# Patient Record
Sex: FEMALE | Race: WHITE | NOT HISPANIC OR LATINO
[De-identification: names, ages, dates, MRNs, and addresses within clinical notes are randomized per-mention and may not be internally consistent; named-entity substitution may affect disease eponyms.]

---

## 2019-01-28 ENCOUNTER — APPOINTMENT (OUTPATIENT)
Dept: ORTHOPEDIC SURGERY | Facility: CLINIC | Age: 54
End: 2019-01-28
Payer: COMMERCIAL

## 2019-01-28 DIAGNOSIS — Z78.9 OTHER SPECIFIED HEALTH STATUS: ICD-10-CM

## 2019-01-28 DIAGNOSIS — M25.561 PAIN IN RIGHT KNEE: ICD-10-CM

## 2019-01-28 DIAGNOSIS — F41.9 ANXIETY DISORDER, UNSPECIFIED: ICD-10-CM

## 2019-01-28 DIAGNOSIS — Z82.49 FAMILY HISTORY OF ISCHEMIC HEART DISEASE AND OTHER DISEASES OF THE CIRCULATORY SYSTEM: ICD-10-CM

## 2019-01-28 PROBLEM — Z00.00 ENCOUNTER FOR PREVENTIVE HEALTH EXAMINATION: Status: ACTIVE | Noted: 2019-01-28

## 2019-01-28 PROCEDURE — 99203 OFFICE O/P NEW LOW 30 MIN: CPT

## 2019-01-28 PROCEDURE — 73502 X-RAY EXAM HIP UNI 2-3 VIEWS: CPT | Mod: RT

## 2019-01-28 PROCEDURE — 73562 X-RAY EXAM OF KNEE 3: CPT | Mod: RT

## 2019-01-28 RX ORDER — ESCITALOPRAM OXALATE 10 MG/1
10 TABLET ORAL
Refills: 0 | Status: ACTIVE | COMMUNITY
Start: 2019-01-28

## 2019-01-28 RX ORDER — TIZANIDINE 4 MG/1
4 TABLET ORAL
Qty: 42 | Refills: 0 | Status: DISCONTINUED | COMMUNITY
Start: 2018-09-15

## 2019-01-28 RX ORDER — CYCLOBENZAPRINE HYDROCHLORIDE 5 MG/1
5 TABLET, FILM COATED ORAL
Qty: 14 | Refills: 0 | Status: DISCONTINUED | COMMUNITY
Start: 2018-09-29

## 2019-01-28 RX ORDER — IBUPROFEN 800 MG/1
800 TABLET, FILM COATED ORAL
Qty: 42 | Refills: 0 | Status: DISCONTINUED | COMMUNITY
Start: 2018-09-15

## 2019-01-28 NOTE — PHYSICAL EXAM
[] : Motor: [Motor Strength Lower Extremities] : right (5/5) [2+] : left 2+ [Normal] : Alert and in no acute distress [de-identified] : Full ROM about the right knee with flexion and extension decreased ROM with internal and external rotation about the right hip. Pain with motion about the right hip.  [de-identified] : Sensation grossly intact about bilateral lower extremity.  [de-identified] : Non tender to palpation, no swelling [de-identified] : Radiographs done on January 28, 2019 AP pelvis lateral of the right hip AP lateral skyline both knees demonstrate a bony structures to be intact no fractures or dislocations she has a well-maintained joint space of the left hip for bone-on-bone arthritis of the right hip with subchondral sclerosis and osteophyte formation the radiographs of the knees show moderate medial compartment narrowing bilaterally a little more her advanced on the left than the right. Impression end-stage osteoarthritis of the right hip moderate medial compartment arthritis both knees

## 2019-01-28 NOTE — HISTORY OF PRESENT ILLNESS
[Pain Location] : pain [Standing] : standing [Hip Movement] : worsened by hip movement [Walking] : worsened by walking [Acetaminophen] : relieved by acetaminophen [Heat] : relieved by heat [Ice] : relieved by ice [NSAIDs] : relieved by nonsteroidal anti-inflammatory drugs [de-identified] : 53 year old female presents to the office today complaining of right knee pain x1 year and right hip pain x6 months. Pt reports difficulty walking up and down stairs and standing from a seated position. Pt describes the knee pain as heavy in nature. Pt states that while the knee pain began first, the right hip pain has become worse. Pt states that she can not sleep on her right side. Pt has been using a massage/heating device and reports some relief in the past week.  [de-identified] : Sitting to standing

## 2019-01-28 NOTE — ASSESSMENT
[FreeTextEntry1] : This is a 53-year-old female who comes in for her first evaluation of pain in her right hip. She describes the pain as being located in the groin with radiation down the thigh but at times it goes past the knee into the lower leg and ankle. She is only taking over-the-counter medications for this pain she's had no prescription nonsteroidal anti-inflammatories no injections no specific treatment whatsoever. She has been taking glucosamine and chondroitin sulfate as well as tumor the pain is at times severe it prevents her from walking going up and down stairs putting on her shoes and socks and doing many of her activities of daily living on physical exam she walks with antalgic gait she has a painless range of motion of the left hip and both knees are restricted painful range of motion of the right hip which elicits pain in the groin. I explained to her that she has osteoarthritis of the hip we went over treatment options we decided to initiate conservative care with meloxicam 15 mg p.o. q.d. and we'll see her back in a month

## 2019-02-27 ENCOUNTER — RX RENEWAL (OUTPATIENT)
Age: 54
End: 2019-02-27

## 2019-03-01 ENCOUNTER — APPOINTMENT (OUTPATIENT)
Dept: ORTHOPEDIC SURGERY | Facility: CLINIC | Age: 54
End: 2019-03-01
Payer: COMMERCIAL

## 2019-03-01 ENCOUNTER — TRANSCRIPTION ENCOUNTER (OUTPATIENT)
Age: 54
End: 2019-03-01

## 2019-03-01 PROCEDURE — 99212 OFFICE O/P EST SF 10 MIN: CPT

## 2019-03-01 NOTE — REASON FOR VISIT
[Follow-Up Visit] : a follow-up visit for [Hip Pain] : hip pain [Osteoarthritis, Hip] : osteoarthritis, hip [Knee Pain] : knee pain [Osteoarthritis, Knee] : osteoarthritis, knee

## 2019-03-04 NOTE — HISTORY OF PRESENT ILLNESS
[Pain Location] : pain [Standing] : standing [Hip Movement] : worsened by hip movement [Walking] : worsened by walking [Acetaminophen] : relieved by acetaminophen [Heat] : relieved by heat [Ice] : relieved by ice [NSAIDs] : relieved by nonsteroidal anti-inflammatory drugs [de-identified] : 53 year old female presents to the office today complaining of right knee pain x1 year and right hip pain x6 months. Pt reports difficulty walking up and down stairs and standing from a seated position. Pt describes the knee pain as heavy in nature. Pt states that while the knee pain began first, the right hip pain has become worse. Pt was seen in our office one month ago and was prescribed Meloxicam with mild relief. Patient reports doing at home exercises that have also given her some relief. Patient will be starting physical therapy later today.  [de-identified] : Sitting to standing

## 2019-03-04 NOTE — PHYSICAL EXAM
[] : Motor: [Motor Strength Lower Extremities] : right (5/5) [2+] : left 2+ [Normal] : Alert and in no acute distress [de-identified] : Full ROM about the right knee with flexion and extension decreased ROM with internal and external rotation about the right hip. Pain with motion about the right hip.  [de-identified] : Sensation grossly intact about bilateral lower extremity.  [de-identified] : Non tender to palpation, no swelling [de-identified] : Radiographs done on January 28, 2019 AP pelvis lateral of the right hip AP lateral skyline both knees demonstrate a bony structures to be intact no fractures or dislocations she has a well-maintained joint space of the left hip for bone-on-bone arthritis of the right hip with subchondral sclerosis and osteophyte formation the radiographs of the knees show moderate medial compartment narrowing bilaterally a little more her advanced on the left than the right. Impression end-stage osteoarthritis of the right hip moderate medial compartment arthritis both knees

## 2019-05-08 ENCOUNTER — RX RENEWAL (OUTPATIENT)
Age: 54
End: 2019-05-08

## 2019-07-03 ENCOUNTER — APPOINTMENT (OUTPATIENT)
Dept: ORTHOPEDIC SURGERY | Facility: CLINIC | Age: 54
End: 2019-07-03
Payer: COMMERCIAL

## 2019-07-03 DIAGNOSIS — M25.551 PAIN IN RIGHT HIP: ICD-10-CM

## 2019-07-03 DIAGNOSIS — M16.11 UNILATERAL PRIMARY OSTEOARTHRITIS, RIGHT HIP: ICD-10-CM

## 2019-07-03 DIAGNOSIS — Z86.79 PERSONAL HISTORY OF OTHER DISEASES OF THE CIRCULATORY SYSTEM: ICD-10-CM

## 2019-07-03 PROCEDURE — 99214 OFFICE O/P EST MOD 30 MIN: CPT | Mod: 57

## 2019-07-03 NOTE — ASSESSMENT
[FreeTextEntry1] : Pt here for fu for her arthritic right hip. She was started on Mobic for her last visit. It unfortunately did not help her enough ad the pain is significant enough that he would like to proceed with elective hip replacement surgery. Since the last visit she was started on a anti hypertensive medication, there was also a question to if the Mobic could have contributed to this. Pt will be scheduled for elective RTH , she will see Dr. Park for medical eval prior to surgery. \par \par The risks, benefits, alternatives of treatment, and aftercare precautions following joint replacement surgery were reviewed with the patient and all questions were answered. Models were used, radiographs reviewed and a brochure was given to the patient. The implant type utilized, including bearing surfaces fixation techniques were reviewed in detail, and the patient chose to proceed with elective joint replacement surgery. The patient's body mass index was recorded in my office notes, and for those patients whose  body mass index of greater than 30, I recommended that they review a weight reduction program with their internist. The importance of smoking cessation was reviewed with all patient's, and for those patients who still smoke, I recommended that they review a smoking cessation program with their internist.

## 2019-07-16 ENCOUNTER — OUTPATIENT (OUTPATIENT)
Dept: OUTPATIENT SERVICES | Facility: HOSPITAL | Age: 54
LOS: 1 days | Discharge: HOME | End: 2019-07-16
Payer: COMMERCIAL

## 2019-07-16 VITALS
HEART RATE: 72 BPM | WEIGHT: 202.83 LBS | TEMPERATURE: 98 F | HEIGHT: 63 IN | RESPIRATION RATE: 16 BRPM | SYSTOLIC BLOOD PRESSURE: 145 MMHG | DIASTOLIC BLOOD PRESSURE: 86 MMHG | OXYGEN SATURATION: 97 %

## 2019-07-16 DIAGNOSIS — Z98.890 OTHER SPECIFIED POSTPROCEDURAL STATES: Chronic | ICD-10-CM

## 2019-07-16 DIAGNOSIS — Z01.818 ENCOUNTER FOR OTHER PREPROCEDURAL EXAMINATION: ICD-10-CM

## 2019-07-16 DIAGNOSIS — M16.11 UNILATERAL PRIMARY OSTEOARTHRITIS, RIGHT HIP: ICD-10-CM

## 2019-07-16 DIAGNOSIS — Z90.49 ACQUIRED ABSENCE OF OTHER SPECIFIED PARTS OF DIGESTIVE TRACT: Chronic | ICD-10-CM

## 2019-07-16 DIAGNOSIS — Z90.710 ACQUIRED ABSENCE OF BOTH CERVIX AND UTERUS: Chronic | ICD-10-CM

## 2019-07-16 LAB
ALBUMIN SERPL ELPH-MCNC: 4.6 G/DL — SIGNIFICANT CHANGE UP (ref 3.5–5.2)
ALP SERPL-CCNC: 93 U/L — SIGNIFICANT CHANGE UP (ref 30–115)
ALT FLD-CCNC: 21 U/L — SIGNIFICANT CHANGE UP (ref 0–41)
ANION GAP SERPL CALC-SCNC: 14 MMOL/L — SIGNIFICANT CHANGE UP (ref 7–14)
APPEARANCE UR: CLEAR — SIGNIFICANT CHANGE UP
APTT BLD: 35.7 SEC — SIGNIFICANT CHANGE UP (ref 27–39.2)
AST SERPL-CCNC: 15 U/L — SIGNIFICANT CHANGE UP (ref 0–41)
BACTERIA # UR AUTO: NEGATIVE — SIGNIFICANT CHANGE UP
BASOPHILS # BLD AUTO: 0.06 K/UL — SIGNIFICANT CHANGE UP (ref 0–0.2)
BASOPHILS NFR BLD AUTO: 1 % — SIGNIFICANT CHANGE UP (ref 0–1)
BILIRUB SERPL-MCNC: 0.4 MG/DL — SIGNIFICANT CHANGE UP (ref 0.2–1.2)
BILIRUB UR-MCNC: NEGATIVE — SIGNIFICANT CHANGE UP
BLD GP AB SCN SERPL QL: SIGNIFICANT CHANGE UP
BUN SERPL-MCNC: 10 MG/DL — SIGNIFICANT CHANGE UP (ref 10–20)
CALCIUM SERPL-MCNC: 9.7 MG/DL — SIGNIFICANT CHANGE UP (ref 8.5–10.1)
CHLORIDE SERPL-SCNC: 100 MMOL/L — SIGNIFICANT CHANGE UP (ref 98–110)
CO2 SERPL-SCNC: 27 MMOL/L — SIGNIFICANT CHANGE UP (ref 17–32)
COLOR SPEC: YELLOW — SIGNIFICANT CHANGE UP
CREAT SERPL-MCNC: 0.6 MG/DL — LOW (ref 0.7–1.5)
DIFF PNL FLD: NEGATIVE — SIGNIFICANT CHANGE UP
EOSINOPHIL # BLD AUTO: 0.1 K/UL — SIGNIFICANT CHANGE UP (ref 0–0.7)
EOSINOPHIL NFR BLD AUTO: 1.6 % — SIGNIFICANT CHANGE UP (ref 0–8)
EPI CELLS # UR: 3 /HPF — SIGNIFICANT CHANGE UP (ref 0–5)
ESTIMATED AVERAGE GLUCOSE: 117 MG/DL — HIGH (ref 68–114)
GLUCOSE SERPL-MCNC: 109 MG/DL — HIGH (ref 70–99)
GLUCOSE UR QL: NEGATIVE — SIGNIFICANT CHANGE UP
HBA1C BLD-MCNC: 5.7 % — HIGH (ref 4–5.6)
HCT VFR BLD CALC: 40.6 % — SIGNIFICANT CHANGE UP (ref 37–47)
HGB BLD-MCNC: 13.7 G/DL — SIGNIFICANT CHANGE UP (ref 12–16)
HYALINE CASTS # UR AUTO: 2 /LPF — SIGNIFICANT CHANGE UP (ref 0–7)
IMM GRANULOCYTES NFR BLD AUTO: 0.2 % — SIGNIFICANT CHANGE UP (ref 0.1–0.3)
INR BLD: 0.92 RATIO — SIGNIFICANT CHANGE UP (ref 0.65–1.3)
KETONES UR-MCNC: NEGATIVE — SIGNIFICANT CHANGE UP
LEUKOCYTE ESTERASE UR-ACNC: NEGATIVE — SIGNIFICANT CHANGE UP
LYMPHOCYTES # BLD AUTO: 1.95 K/UL — SIGNIFICANT CHANGE UP (ref 1.2–3.4)
LYMPHOCYTES # BLD AUTO: 31.3 % — SIGNIFICANT CHANGE UP (ref 20.5–51.1)
MCHC RBC-ENTMCNC: 29.7 PG — SIGNIFICANT CHANGE UP (ref 27–31)
MCHC RBC-ENTMCNC: 33.7 G/DL — SIGNIFICANT CHANGE UP (ref 32–37)
MCV RBC AUTO: 88.1 FL — SIGNIFICANT CHANGE UP (ref 81–99)
MONOCYTES # BLD AUTO: 0.39 K/UL — SIGNIFICANT CHANGE UP (ref 0.1–0.6)
MONOCYTES NFR BLD AUTO: 6.3 % — SIGNIFICANT CHANGE UP (ref 1.7–9.3)
MRSA PCR RESULT.: NEGATIVE — SIGNIFICANT CHANGE UP
NEUTROPHILS # BLD AUTO: 3.72 K/UL — SIGNIFICANT CHANGE UP (ref 1.4–6.5)
NEUTROPHILS NFR BLD AUTO: 59.6 % — SIGNIFICANT CHANGE UP (ref 42.2–75.2)
NITRITE UR-MCNC: NEGATIVE — SIGNIFICANT CHANGE UP
NRBC # BLD: 0 /100 WBCS — SIGNIFICANT CHANGE UP (ref 0–0)
PH UR: 6 — SIGNIFICANT CHANGE UP (ref 5–8)
PLATELET # BLD AUTO: 333 K/UL — SIGNIFICANT CHANGE UP (ref 130–400)
POTASSIUM SERPL-MCNC: 4.3 MMOL/L — SIGNIFICANT CHANGE UP (ref 3.5–5)
POTASSIUM SERPL-SCNC: 4.3 MMOL/L — SIGNIFICANT CHANGE UP (ref 3.5–5)
PROT SERPL-MCNC: 7.8 G/DL — SIGNIFICANT CHANGE UP (ref 6–8)
PROT UR-MCNC: ABNORMAL
PROTHROM AB SERPL-ACNC: 10.6 SEC — SIGNIFICANT CHANGE UP (ref 9.95–12.87)
RBC # BLD: 4.61 M/UL — SIGNIFICANT CHANGE UP (ref 4.2–5.4)
RBC # FLD: 13.4 % — SIGNIFICANT CHANGE UP (ref 11.5–14.5)
RBC CASTS # UR COMP ASSIST: 1 /HPF — SIGNIFICANT CHANGE UP (ref 0–4)
SODIUM SERPL-SCNC: 141 MMOL/L — SIGNIFICANT CHANGE UP (ref 135–146)
SP GR SPEC: 1.03 — HIGH (ref 1.01–1.02)
UROBILINOGEN FLD QL: SIGNIFICANT CHANGE UP
WBC # BLD: 6.23 K/UL — SIGNIFICANT CHANGE UP (ref 4.8–10.8)
WBC # FLD AUTO: 6.23 K/UL — SIGNIFICANT CHANGE UP (ref 4.8–10.8)
WBC UR QL: 2 /HPF — SIGNIFICANT CHANGE UP (ref 0–5)

## 2019-07-16 PROCEDURE — 93010 ELECTROCARDIOGRAM REPORT: CPT

## 2019-07-16 PROCEDURE — 71046 X-RAY EXAM CHEST 2 VIEWS: CPT | Mod: 26

## 2019-07-16 PROCEDURE — 73502 X-RAY EXAM HIP UNI 2-3 VIEWS: CPT | Mod: 26,RT

## 2019-07-16 NOTE — H&P PST ADULT - NSICDXPASTSURGICALHX_GEN_ALL_CORE_FT
PAST SURGICAL HISTORY:  H/O abdominoplasty     History of appendectomy     History of hysterectomy     S/P colonoscopy with polypectomy

## 2019-07-16 NOTE — H&P PST ADULT - REASON FOR ADMISSION
54 yo female presents with long hx right hip pain, pt is scheduled for rthr;  denies chest pain, palpitations, shortness of breath, dyspnea, or dysuria. exercise tolerance: 2 blocks/ flights of stairs w/o sob, ex pilar: limited dt pain

## 2019-07-16 NOTE — H&P PST ADULT - NSICDXPASTMEDICALHX_GEN_ALL_CORE_FT
PAST MEDICAL HISTORY:  Anxiety     Fuchs' corneal dystrophy     H/O superficial phlebitis "years ago"    HTN (hypertension)     Murmur     OA (osteoarthritis)

## 2019-07-17 LAB
CULTURE RESULTS: SIGNIFICANT CHANGE UP
SPECIMEN SOURCE: SIGNIFICANT CHANGE UP

## 2019-07-19 ENCOUNTER — APPOINTMENT (OUTPATIENT)
Dept: CARDIOLOGY | Facility: CLINIC | Age: 54
End: 2019-07-19

## 2019-07-19 NOTE — ASU PATIENT PROFILE, ADULT - PSH
H/O abdominoplasty    History of appendectomy    History of hysterectomy    S/P colonoscopy with polypectomy H/O abdominoplasty    H/O colonoscopy with polypectomy  last colonoscopy 4 years ago  History of appendectomy    History of hysterectomy    S/P colonoscopy with polypectomy

## 2019-07-19 NOTE — ASU PATIENT PROFILE, ADULT - PMH
Anxiety    Fuchs' corneal dystrophy    H/O superficial phlebitis  "years ago"  HTN (hypertension)    Murmur    OA (osteoarthritis)

## 2019-07-22 ENCOUNTER — TRANSCRIPTION ENCOUNTER (OUTPATIENT)
Age: 54
End: 2019-07-22

## 2019-07-22 ENCOUNTER — OUTPATIENT (OUTPATIENT)
Dept: OUTPATIENT SERVICES | Facility: HOSPITAL | Age: 54
LOS: 1 days | Discharge: HOME | End: 2019-07-22
Payer: COMMERCIAL

## 2019-07-22 ENCOUNTER — RESULT REVIEW (OUTPATIENT)
Age: 54
End: 2019-07-22

## 2019-07-22 VITALS
WEIGHT: 199.08 LBS | DIASTOLIC BLOOD PRESSURE: 78 MMHG | HEIGHT: 63 IN | OXYGEN SATURATION: 96 % | TEMPERATURE: 96 F | RESPIRATION RATE: 17 BRPM | HEART RATE: 77 BPM | SYSTOLIC BLOOD PRESSURE: 139 MMHG

## 2019-07-22 VITALS — SYSTOLIC BLOOD PRESSURE: 139 MMHG | DIASTOLIC BLOOD PRESSURE: 85 MMHG | HEART RATE: 94 BPM | RESPIRATION RATE: 17 BRPM

## 2019-07-22 DIAGNOSIS — Z98.890 OTHER SPECIFIED POSTPROCEDURAL STATES: Chronic | ICD-10-CM

## 2019-07-22 DIAGNOSIS — Z90.710 ACQUIRED ABSENCE OF BOTH CERVIX AND UTERUS: Chronic | ICD-10-CM

## 2019-07-22 DIAGNOSIS — Z90.49 ACQUIRED ABSENCE OF OTHER SPECIFIED PARTS OF DIGESTIVE TRACT: Chronic | ICD-10-CM

## 2019-07-22 PROCEDURE — 88305 TISSUE EXAM BY PATHOLOGIST: CPT | Mod: 26

## 2019-07-22 PROCEDURE — 88312 SPECIAL STAINS GROUP 1: CPT | Mod: 26

## 2019-07-22 PROCEDURE — 45380 COLONOSCOPY AND BIOPSY: CPT

## 2019-07-22 PROCEDURE — 43239 EGD BIOPSY SINGLE/MULTIPLE: CPT | Mod: XS

## 2019-07-23 LAB
SURGICAL PATHOLOGY STUDY: SIGNIFICANT CHANGE UP
SURGICAL PATHOLOGY STUDY: SIGNIFICANT CHANGE UP

## 2019-07-25 DIAGNOSIS — Z87.19 PERSONAL HISTORY OF OTHER DISEASES OF THE DIGESTIVE SYSTEM: ICD-10-CM

## 2019-07-25 DIAGNOSIS — K29.50 UNSPECIFIED CHRONIC GASTRITIS WITHOUT BLEEDING: ICD-10-CM

## 2019-07-25 DIAGNOSIS — R01.1 CARDIAC MURMUR, UNSPECIFIED: ICD-10-CM

## 2019-07-25 DIAGNOSIS — M19.90 UNSPECIFIED OSTEOARTHRITIS, UNSPECIFIED SITE: ICD-10-CM

## 2019-07-25 DIAGNOSIS — D12.4 BENIGN NEOPLASM OF DESCENDING COLON: ICD-10-CM

## 2019-07-25 DIAGNOSIS — D13.1 BENIGN NEOPLASM OF STOMACH: ICD-10-CM

## 2019-07-25 DIAGNOSIS — Z91.040 LATEX ALLERGY STATUS: ICD-10-CM

## 2019-07-25 DIAGNOSIS — Z12.11 ENCOUNTER FOR SCREENING FOR MALIGNANT NEOPLASM OF COLON: ICD-10-CM

## 2019-07-25 DIAGNOSIS — F41.9 ANXIETY DISORDER, UNSPECIFIED: ICD-10-CM

## 2019-07-25 DIAGNOSIS — K64.4 RESIDUAL HEMORRHOIDAL SKIN TAGS: ICD-10-CM

## 2019-07-25 DIAGNOSIS — I10 ESSENTIAL (PRIMARY) HYPERTENSION: ICD-10-CM

## 2019-07-25 DIAGNOSIS — B96.81 HELICOBACTER PYLORI [H. PYLORI] AS THE CAUSE OF DISEASES CLASSIFIED ELSEWHERE: ICD-10-CM

## 2019-07-25 DIAGNOSIS — Z88.3 ALLERGY STATUS TO OTHER ANTI-INFECTIVE AGENTS: ICD-10-CM

## 2019-07-25 DIAGNOSIS — K64.8 OTHER HEMORRHOIDS: ICD-10-CM

## 2019-07-25 PROBLEM — H18.51 ENDOTHELIAL CORNEAL DYSTROPHY: Chronic | Status: ACTIVE | Noted: 2019-07-16

## 2019-07-25 PROBLEM — Z86.79 PERSONAL HISTORY OF OTHER DISEASES OF THE CIRCULATORY SYSTEM: Chronic | Status: ACTIVE | Noted: 2019-07-16

## 2019-07-29 NOTE — PROGRESS NOTE ADULT - SUBJECTIVE AND OBJECTIVE BOX
PAST documents reviewed - MED. DIR. PAST - ANESTHESIA - as of this review for patient scheduled for Right THR under Spinal / Regional Anesthesia with  on 07/30/2019 : I called the patient to discuss the Anesthesia Plan and Medications. She attended the Joint class and is aware of the ERAS / Gatorade protocol and her understanding was confirmed with our discussion . She knows she is to receive Spinal / Regional Anesthesia which I explained to her preliminarily and informed her that the assigned Anesthesiologist would explain everything in full pre op . If patient takes her Tylenol tonight prior to MN with a sip of H2O she was told to inform the admitting RN of the time and dosage . She had taken Aleve which she stopped > 7 days . She will not take her HCTZ DOS . She takes no AC / Antiplatelets / other RX or OTC NSAID ' s / nor ASA containing meds ( all explained to her in full ) . Lab work has been reviewed and appropriate pre op meds are ordered .

## 2019-07-30 ENCOUNTER — INPATIENT (INPATIENT)
Facility: HOSPITAL | Age: 54
LOS: 1 days | Discharge: HOME HEALTH | End: 2019-08-01
Attending: ORTHOPAEDIC SURGERY | Admitting: ORTHOPAEDIC SURGERY
Payer: COMMERCIAL

## 2019-07-30 ENCOUNTER — RESULT REVIEW (OUTPATIENT)
Age: 54
End: 2019-07-30

## 2019-07-30 ENCOUNTER — APPOINTMENT (OUTPATIENT)
Dept: ORTHOPEDIC SURGERY | Facility: HOSPITAL | Age: 54
End: 2019-07-30
Payer: COMMERCIAL

## 2019-07-30 VITALS
TEMPERATURE: 98 F | HEART RATE: 71 BPM | DIASTOLIC BLOOD PRESSURE: 99 MMHG | RESPIRATION RATE: 18 BRPM | SYSTOLIC BLOOD PRESSURE: 134 MMHG | HEIGHT: 64 IN | WEIGHT: 199.96 LBS

## 2019-07-30 DIAGNOSIS — I10 ESSENTIAL (PRIMARY) HYPERTENSION: ICD-10-CM

## 2019-07-30 DIAGNOSIS — M16.11 UNILATERAL PRIMARY OSTEOARTHRITIS, RIGHT HIP: ICD-10-CM

## 2019-07-30 DIAGNOSIS — Z90.710 ACQUIRED ABSENCE OF BOTH CERVIX AND UTERUS: Chronic | ICD-10-CM

## 2019-07-30 DIAGNOSIS — Z90.49 ACQUIRED ABSENCE OF OTHER SPECIFIED PARTS OF DIGESTIVE TRACT: Chronic | ICD-10-CM

## 2019-07-30 DIAGNOSIS — F41.9 ANXIETY DISORDER, UNSPECIFIED: ICD-10-CM

## 2019-07-30 DIAGNOSIS — Z98.890 OTHER SPECIFIED POSTPROCEDURAL STATES: Chronic | ICD-10-CM

## 2019-07-30 DIAGNOSIS — H18.51 ENDOTHELIAL CORNEAL DYSTROPHY: ICD-10-CM

## 2019-07-30 DIAGNOSIS — Z91.040 LATEX ALLERGY STATUS: ICD-10-CM

## 2019-07-30 LAB
ABO RH CONFIRMATION: SIGNIFICANT CHANGE UP
GLUCOSE BLDC GLUCOMTR-MCNC: 128 MG/DL — HIGH (ref 70–99)
GLUCOSE BLDC GLUCOMTR-MCNC: 83 MG/DL — SIGNIFICANT CHANGE UP (ref 70–99)
GLUCOSE BLDC GLUCOMTR-MCNC: 89 MG/DL — SIGNIFICANT CHANGE UP (ref 70–99)

## 2019-07-30 PROCEDURE — 88311 DECALCIFY TISSUE: CPT | Mod: 26

## 2019-07-30 PROCEDURE — 72170 X-RAY EXAM OF PELVIS: CPT | Mod: 26

## 2019-07-30 PROCEDURE — 88305 TISSUE EXAM BY PATHOLOGIST: CPT | Mod: 26

## 2019-07-30 PROCEDURE — 27447 TOTAL KNEE ARTHROPLASTY: CPT | Mod: RT

## 2019-07-30 PROCEDURE — 27130 TOTAL HIP ARTHROPLASTY: CPT | Mod: RT

## 2019-07-30 RX ORDER — POLYETHYLENE GLYCOL 3350 17 G/17G
17 POWDER, FOR SOLUTION ORAL DAILY
Refills: 0 | Status: DISCONTINUED | OUTPATIENT
Start: 2019-07-30 | End: 2019-08-01

## 2019-07-30 RX ORDER — DOCUSATE SODIUM 100 MG
100 CAPSULE ORAL THREE TIMES A DAY
Refills: 0 | Status: DISCONTINUED | OUTPATIENT
Start: 2019-07-30 | End: 2019-08-01

## 2019-07-30 RX ORDER — LOSARTAN POTASSIUM 100 MG/1
50 TABLET, FILM COATED ORAL DAILY
Refills: 0 | Status: DISCONTINUED | OUTPATIENT
Start: 2019-07-30 | End: 2019-08-01

## 2019-07-30 RX ORDER — ASPIRIN/CALCIUM CARB/MAGNESIUM 324 MG
81 TABLET ORAL EVERY 12 HOURS
Refills: 0 | Status: DISCONTINUED | OUTPATIENT
Start: 2019-07-30 | End: 2019-08-01

## 2019-07-30 RX ORDER — KETOROLAC TROMETHAMINE 30 MG/ML
15 SYRINGE (ML) INJECTION EVERY 6 HOURS
Refills: 0 | Status: DISCONTINUED | OUTPATIENT
Start: 2019-07-31 | End: 2019-07-31

## 2019-07-30 RX ORDER — HYDROCHLOROTHIAZIDE 25 MG
12.5 TABLET ORAL DAILY
Refills: 0 | Status: DISCONTINUED | OUTPATIENT
Start: 2019-07-30 | End: 2019-08-01

## 2019-07-30 RX ORDER — LOSARTAN POTASSIUM 100 MG/1
1 TABLET, FILM COATED ORAL
Qty: 0 | Refills: 0 | DISCHARGE

## 2019-07-30 RX ORDER — TRAMADOL HYDROCHLORIDE 50 MG/1
50 TABLET ORAL EVERY 6 HOURS
Refills: 0 | Status: DISCONTINUED | OUTPATIENT
Start: 2019-07-30 | End: 2019-07-31

## 2019-07-30 RX ORDER — SENNA PLUS 8.6 MG/1
2 TABLET ORAL AT BEDTIME
Refills: 0 | Status: DISCONTINUED | OUTPATIENT
Start: 2019-07-30 | End: 2019-08-01

## 2019-07-30 RX ORDER — DEXAMETHASONE 0.5 MG/5ML
4 ELIXIR ORAL ONCE
Refills: 0 | Status: COMPLETED | OUTPATIENT
Start: 2019-07-31 | End: 2019-07-31

## 2019-07-30 RX ORDER — GABAPENTIN 400 MG/1
300 CAPSULE ORAL ONCE
Refills: 0 | Status: COMPLETED | OUTPATIENT
Start: 2019-07-30 | End: 2019-07-30

## 2019-07-30 RX ORDER — ACETAMINOPHEN 500 MG
650 TABLET ORAL EVERY 6 HOURS
Refills: 0 | Status: DISCONTINUED | OUTPATIENT
Start: 2019-07-30 | End: 2019-08-01

## 2019-07-30 RX ORDER — CELECOXIB 200 MG/1
400 CAPSULE ORAL ONCE
Refills: 0 | Status: COMPLETED | OUTPATIENT
Start: 2019-07-30 | End: 2019-07-30

## 2019-07-30 RX ORDER — HYDROMORPHONE HYDROCHLORIDE 2 MG/ML
1 INJECTION INTRAMUSCULAR; INTRAVENOUS; SUBCUTANEOUS
Refills: 0 | Status: DISCONTINUED | OUTPATIENT
Start: 2019-07-30 | End: 2019-07-31

## 2019-07-30 RX ORDER — MAGNESIUM HYDROXIDE 400 MG/1
30 TABLET, CHEWABLE ORAL DAILY
Refills: 0 | Status: DISCONTINUED | OUTPATIENT
Start: 2019-07-30 | End: 2019-08-01

## 2019-07-30 RX ORDER — GABAPENTIN 400 MG/1
100 CAPSULE ORAL THREE TIMES A DAY
Refills: 0 | Status: DISCONTINUED | OUTPATIENT
Start: 2019-07-30 | End: 2019-08-01

## 2019-07-30 RX ORDER — ONDANSETRON 8 MG/1
4 TABLET, FILM COATED ORAL EVERY 6 HOURS
Refills: 0 | Status: DISCONTINUED | OUTPATIENT
Start: 2019-07-30 | End: 2019-08-01

## 2019-07-30 RX ORDER — ESCITALOPRAM OXALATE 10 MG/1
10 TABLET, FILM COATED ORAL DAILY
Refills: 0 | Status: DISCONTINUED | OUTPATIENT
Start: 2019-07-30 | End: 2019-08-01

## 2019-07-30 RX ORDER — CEFAZOLIN SODIUM 1 G
2000 VIAL (EA) INJECTION EVERY 8 HOURS
Refills: 0 | Status: COMPLETED | OUTPATIENT
Start: 2019-07-31 | End: 2019-07-31

## 2019-07-30 RX ORDER — SODIUM CHLORIDE 9 MG/ML
1000 INJECTION, SOLUTION INTRAVENOUS
Refills: 0 | Status: DISCONTINUED | OUTPATIENT
Start: 2019-07-30 | End: 2019-07-31

## 2019-07-30 RX ORDER — PANTOPRAZOLE SODIUM 20 MG/1
40 TABLET, DELAYED RELEASE ORAL
Refills: 0 | Status: DISCONTINUED | OUTPATIENT
Start: 2019-07-30 | End: 2019-08-01

## 2019-07-30 RX ORDER — ESCITALOPRAM OXALATE 10 MG/1
1 TABLET, FILM COATED ORAL
Qty: 0 | Refills: 0 | DISCHARGE

## 2019-07-30 RX ORDER — GLUCOSAMINE HCL/CHONDROITIN SU 500-400 MG
0 CAPSULE ORAL
Qty: 0 | Refills: 0 | DISCHARGE

## 2019-07-30 RX ORDER — HYDROMORPHONE HYDROCHLORIDE 2 MG/ML
0.5 INJECTION INTRAMUSCULAR; INTRAVENOUS; SUBCUTANEOUS
Refills: 0 | Status: DISCONTINUED | OUTPATIENT
Start: 2019-07-30 | End: 2019-07-31

## 2019-07-30 RX ORDER — FERROUS SULFATE 325(65) MG
325 TABLET ORAL
Refills: 0 | Status: DISCONTINUED | OUTPATIENT
Start: 2019-07-30 | End: 2019-08-01

## 2019-07-30 RX ORDER — ONDANSETRON 8 MG/1
4 TABLET, FILM COATED ORAL ONCE
Refills: 0 | Status: DISCONTINUED | OUTPATIENT
Start: 2019-07-30 | End: 2019-07-31

## 2019-07-30 RX ORDER — SODIUM CHLORIDE 9 MG/ML
1000 INJECTION INTRAMUSCULAR; INTRAVENOUS; SUBCUTANEOUS
Refills: 0 | Status: DISCONTINUED | OUTPATIENT
Start: 2019-07-30 | End: 2019-08-01

## 2019-07-30 RX ORDER — ACETAMINOPHEN 500 MG
1000 TABLET ORAL ONCE
Refills: 0 | Status: COMPLETED | OUTPATIENT
Start: 2019-07-30 | End: 2019-07-30

## 2019-07-30 RX ORDER — CELECOXIB 200 MG/1
200 CAPSULE ORAL DAILY
Refills: 0 | Status: DISCONTINUED | OUTPATIENT
Start: 2019-08-01 | End: 2019-08-01

## 2019-07-30 RX ORDER — ACETAMINOPHEN 500 MG
2 TABLET ORAL
Qty: 0 | Refills: 0 | DISCHARGE

## 2019-07-30 RX ADMIN — SODIUM CHLORIDE 75 MILLILITER(S): 9 INJECTION, SOLUTION INTRAVENOUS at 21:35

## 2019-07-30 RX ADMIN — GABAPENTIN 300 MILLIGRAM(S): 400 CAPSULE ORAL at 12:00

## 2019-07-30 RX ADMIN — CELECOXIB 400 MILLIGRAM(S): 200 CAPSULE ORAL at 12:00

## 2019-07-30 RX ADMIN — Medication 1000 MILLIGRAM(S): at 11:59

## 2019-07-30 NOTE — CHART NOTE - NSCHARTNOTEFT_GEN_A_CORE
PACU ANESTHESIA ADMISSION NOTE      Procedure: Arthoplasty of right hip    Post op diagnosis:  Osteoarthritis of right hip      ____  Intubated  TV:______       Rate: ______      FiO2: ______    _x___  Patent Airway    ____  Full return of protective reflexes    ____  Full recovery from anesthesia / back to baseline     Vitals:   T:  98F         R: 16                 BP: 121/72                 Sat: 97                  P: 65      Mental Status:  __x__ Awake   _____ Alert   _____ Drowsy   _____ Sedated    Nausea/Vomiting: no   Pain Scale (0-10):  ___0__    Treatment: ____ None    ____ See Post - Op/PCA Orders    Post - Operative Fluids:   ____ Oral   ___x_ See Post - Op Orders    Plan: Discharge:   ____Home       __x___Floor     _____Critical Care    _____  Other:_________________    Comments: Pt awake, VS stable, no anesthesia complications.

## 2019-07-30 NOTE — ASU PATIENT PROFILE, ADULT - PSH
H/O abdominoplasty    H/O colonoscopy with polypectomy  last colonoscopy 4 years ago  History of appendectomy    History of hysterectomy    S/P colonoscopy with polypectomy

## 2019-07-31 ENCOUNTER — TRANSCRIPTION ENCOUNTER (OUTPATIENT)
Age: 54
End: 2019-07-31

## 2019-07-31 LAB
ANION GAP SERPL CALC-SCNC: 9 MMOL/L — SIGNIFICANT CHANGE UP (ref 7–14)
BUN SERPL-MCNC: 10 MG/DL — SIGNIFICANT CHANGE UP (ref 10–20)
CALCIUM SERPL-MCNC: 8.7 MG/DL — SIGNIFICANT CHANGE UP (ref 8.5–10.1)
CHLORIDE SERPL-SCNC: 104 MMOL/L — SIGNIFICANT CHANGE UP (ref 98–110)
CO2 SERPL-SCNC: 27 MMOL/L — SIGNIFICANT CHANGE UP (ref 17–32)
CREAT SERPL-MCNC: 0.5 MG/DL — LOW (ref 0.7–1.5)
GLUCOSE SERPL-MCNC: 106 MG/DL — HIGH (ref 70–99)
HCT VFR BLD CALC: 32.4 % — LOW (ref 37–47)
HCT VFR BLD CALC: 32.4 % — LOW (ref 37–47)
HGB BLD-MCNC: 10.7 G/DL — LOW (ref 12–16)
HGB BLD-MCNC: 10.9 G/DL — LOW (ref 12–16)
MCHC RBC-ENTMCNC: 29.6 PG — SIGNIFICANT CHANGE UP (ref 27–31)
MCHC RBC-ENTMCNC: 29.9 PG — SIGNIFICANT CHANGE UP (ref 27–31)
MCHC RBC-ENTMCNC: 33 G/DL — SIGNIFICANT CHANGE UP (ref 32–37)
MCHC RBC-ENTMCNC: 33.6 G/DL — SIGNIFICANT CHANGE UP (ref 32–37)
MCV RBC AUTO: 89 FL — SIGNIFICANT CHANGE UP (ref 81–99)
MCV RBC AUTO: 89.5 FL — SIGNIFICANT CHANGE UP (ref 81–99)
NRBC # BLD: 0 /100 WBCS — SIGNIFICANT CHANGE UP (ref 0–0)
NRBC # BLD: 0 /100 WBCS — SIGNIFICANT CHANGE UP (ref 0–0)
PLATELET # BLD AUTO: 235 K/UL — SIGNIFICANT CHANGE UP (ref 130–400)
PLATELET # BLD AUTO: 266 K/UL — SIGNIFICANT CHANGE UP (ref 130–400)
POTASSIUM SERPL-MCNC: 4.5 MMOL/L — SIGNIFICANT CHANGE UP (ref 3.5–5)
POTASSIUM SERPL-SCNC: 4.5 MMOL/L — SIGNIFICANT CHANGE UP (ref 3.5–5)
RBC # BLD: 3.62 M/UL — LOW (ref 4.2–5.4)
RBC # BLD: 3.64 M/UL — LOW (ref 4.2–5.4)
RBC # FLD: 13.6 % — SIGNIFICANT CHANGE UP (ref 11.5–14.5)
RBC # FLD: 13.7 % — SIGNIFICANT CHANGE UP (ref 11.5–14.5)
SODIUM SERPL-SCNC: 140 MMOL/L — SIGNIFICANT CHANGE UP (ref 135–146)
WBC # BLD: 11.28 K/UL — HIGH (ref 4.8–10.8)
WBC # BLD: 17.24 K/UL — HIGH (ref 4.8–10.8)
WBC # FLD AUTO: 11.28 K/UL — HIGH (ref 4.8–10.8)
WBC # FLD AUTO: 17.24 K/UL — HIGH (ref 4.8–10.8)

## 2019-07-31 RX ORDER — PANTOPRAZOLE SODIUM 20 MG/1
1 TABLET, DELAYED RELEASE ORAL
Qty: 30 | Refills: 0
Start: 2019-07-31 | End: 2019-08-29

## 2019-07-31 RX ORDER — CELECOXIB 200 MG/1
1 CAPSULE ORAL
Qty: 14 | Refills: 0
Start: 2019-07-31 | End: 2019-08-13

## 2019-07-31 RX ORDER — GABAPENTIN 400 MG/1
1 CAPSULE ORAL
Qty: 9 | Refills: 0
Start: 2019-07-31 | End: 2019-08-02

## 2019-07-31 RX ORDER — TRAMADOL HYDROCHLORIDE 50 MG/1
50 TABLET ORAL EVERY 4 HOURS
Refills: 0 | Status: DISCONTINUED | OUTPATIENT
Start: 2019-07-31 | End: 2019-08-01

## 2019-07-31 RX ORDER — ASPIRIN/CALCIUM CARB/MAGNESIUM 324 MG
1 TABLET ORAL
Qty: 60 | Refills: 0
Start: 2019-07-31 | End: 2019-08-29

## 2019-07-31 RX ORDER — OXYCODONE HYDROCHLORIDE 5 MG/1
5 TABLET ORAL EVERY 4 HOURS
Refills: 0 | Status: DISCONTINUED | OUTPATIENT
Start: 2019-07-31 | End: 2019-08-01

## 2019-07-31 RX ORDER — TRAMADOL HYDROCHLORIDE 50 MG/1
1 TABLET ORAL
Qty: 42 | Refills: 0
Start: 2019-07-31 | End: 2019-08-06

## 2019-07-31 RX ADMIN — TRAMADOL HYDROCHLORIDE 50 MILLIGRAM(S): 50 TABLET ORAL at 20:15

## 2019-07-31 RX ADMIN — Medication 15 MILLIGRAM(S): at 17:23

## 2019-07-31 RX ADMIN — ESCITALOPRAM OXALATE 10 MILLIGRAM(S): 10 TABLET, FILM COATED ORAL at 11:39

## 2019-07-31 RX ADMIN — Medication 100 MILLIGRAM(S): at 21:19

## 2019-07-31 RX ADMIN — Medication 650 MILLIGRAM(S): at 00:47

## 2019-07-31 RX ADMIN — Medication 15 MILLIGRAM(S): at 23:24

## 2019-07-31 RX ADMIN — Medication 100 MILLIGRAM(S): at 11:30

## 2019-07-31 RX ADMIN — Medication 1 DROP(S): at 11:31

## 2019-07-31 RX ADMIN — GABAPENTIN 100 MILLIGRAM(S): 400 CAPSULE ORAL at 21:19

## 2019-07-31 RX ADMIN — SODIUM CHLORIDE 100 MILLILITER(S): 9 INJECTION INTRAMUSCULAR; INTRAVENOUS; SUBCUTANEOUS at 19:47

## 2019-07-31 RX ADMIN — Medication 650 MILLIGRAM(S): at 05:53

## 2019-07-31 RX ADMIN — LOSARTAN POTASSIUM 50 MILLIGRAM(S): 100 TABLET, FILM COATED ORAL at 05:53

## 2019-07-31 RX ADMIN — GABAPENTIN 100 MILLIGRAM(S): 400 CAPSULE ORAL at 05:52

## 2019-07-31 RX ADMIN — Medication 325 MILLIGRAM(S): at 17:24

## 2019-07-31 RX ADMIN — Medication 81 MILLIGRAM(S): at 05:52

## 2019-07-31 RX ADMIN — Medication 100 MILLIGRAM(S): at 05:52

## 2019-07-31 RX ADMIN — Medication 15 MILLIGRAM(S): at 05:53

## 2019-07-31 RX ADMIN — PANTOPRAZOLE SODIUM 40 MILLIGRAM(S): 20 TABLET, DELAYED RELEASE ORAL at 05:53

## 2019-07-31 RX ADMIN — TRAMADOL HYDROCHLORIDE 50 MILLIGRAM(S): 50 TABLET ORAL at 05:56

## 2019-07-31 RX ADMIN — Medication 650 MILLIGRAM(S): at 01:17

## 2019-07-31 RX ADMIN — GABAPENTIN 100 MILLIGRAM(S): 400 CAPSULE ORAL at 14:17

## 2019-07-31 RX ADMIN — POLYETHYLENE GLYCOL 3350 17 GRAM(S): 17 POWDER, FOR SOLUTION ORAL at 11:39

## 2019-07-31 RX ADMIN — Medication 650 MILLIGRAM(S): at 23:24

## 2019-07-31 RX ADMIN — Medication 4 MILLIGRAM(S): at 08:41

## 2019-07-31 RX ADMIN — Medication 650 MILLIGRAM(S): at 11:30

## 2019-07-31 RX ADMIN — TRAMADOL HYDROCHLORIDE 50 MILLIGRAM(S): 50 TABLET ORAL at 15:24

## 2019-07-31 RX ADMIN — TRAMADOL HYDROCHLORIDE 50 MILLIGRAM(S): 50 TABLET ORAL at 11:29

## 2019-07-31 RX ADMIN — Medication 325 MILLIGRAM(S): at 11:32

## 2019-07-31 RX ADMIN — SODIUM CHLORIDE 100 MILLILITER(S): 9 INJECTION INTRAMUSCULAR; INTRAVENOUS; SUBCUTANEOUS at 01:40

## 2019-07-31 RX ADMIN — Medication 100 MILLIGRAM(S): at 14:17

## 2019-07-31 RX ADMIN — Medication 15 MILLIGRAM(S): at 05:55

## 2019-07-31 RX ADMIN — TRAMADOL HYDROCHLORIDE 50 MILLIGRAM(S): 50 TABLET ORAL at 11:59

## 2019-07-31 RX ADMIN — Medication 1 TABLET(S): at 11:32

## 2019-07-31 RX ADMIN — Medication 650 MILLIGRAM(S): at 17:24

## 2019-07-31 RX ADMIN — Medication 15 MILLIGRAM(S): at 11:32

## 2019-07-31 RX ADMIN — Medication 81 MILLIGRAM(S): at 17:24

## 2019-07-31 RX ADMIN — TRAMADOL HYDROCHLORIDE 50 MILLIGRAM(S): 50 TABLET ORAL at 05:26

## 2019-07-31 RX ADMIN — Medication 12.5 MILLIGRAM(S): at 05:53

## 2019-07-31 RX ADMIN — TRAMADOL HYDROCHLORIDE 50 MILLIGRAM(S): 50 TABLET ORAL at 20:17

## 2019-07-31 RX ADMIN — TRAMADOL HYDROCHLORIDE 50 MILLIGRAM(S): 50 TABLET ORAL at 15:54

## 2019-07-31 RX ADMIN — Medication 650 MILLIGRAM(S): at 05:55

## 2019-07-31 RX ADMIN — Medication 325 MILLIGRAM(S): at 08:40

## 2019-07-31 NOTE — DISCHARGE NOTE PROVIDER - CARE PROVIDER_API CALL
Contreras Rojas)  Orthopaedic Surgery  [address] [place] [address], [address]  [place] [address]  Phone: [phone]  Fax: [phone]  Follow Up Time:

## 2019-07-31 NOTE — DISCHARGE NOTE PROVIDER - NSDCCPGOAL_GEN_ALL_CORE_FT
To get better and follow your care plan as instructed. To get better and follow your care plan as instructed, cont asa 81 mg bid for dvt ppx, protonix 40 mg po for GI ppx, f/u with dr Rojas as OP in 2 weeks

## 2019-07-31 NOTE — PROGRESS NOTE ADULT - SUBJECTIVE AND OBJECTIVE BOX
Patient seen and examined at bedside post-operatively. Pain well controlled. No complaints at this time.     Physical exam  Vital Signs Last 24 Hrs  T(C): 36.7 (30 Jul 2019 21:35), Max: 36.7 (30 Jul 2019 11:39)  T(F): 98.1 (30 Jul 2019 21:35), Max: 98.1 (30 Jul 2019 11:39)  HR: 73 (30 Jul 2019 23:05) (60 - 73)  BP: 111/62 (30 Jul 2019 23:05) (95/52 - 134/99)  BP(mean): 61 (30 Jul 2019 23:05) (61 - 94)  RR: 15 (30 Jul 2019 23:05) (10 - 18)  SpO2: 98% (30 Jul 2019 22:50) (97% - 100%)    NAD, AOx3  Non-labored breathing   Left Right  Dressing C/D/I  EHL/FHL/GA/TA Motor intact  SP/DP/T/S/S SILT distally  Toes WWP    53F POD#0 s/p R PILLO, doing well    Pain control  DVT ppx  IV Abx x24hrs  WBAT  OOBTC/IS  PT/OT  Continue Home meds  AM labs Patient seen and examined at bedside post-operatively. Pain well controlled. No complaints at this time.     Physical exam  Vital Signs Last 24 Hrs  T(C): 36.7 (30 Jul 2019 21:35), Max: 36.7 (30 Jul 2019 11:39)  T(F): 98.1 (30 Jul 2019 21:35), Max: 98.1 (30 Jul 2019 11:39)  HR: 73 (30 Jul 2019 23:05) (60 - 73)  BP: 111/62 (30 Jul 2019 23:05) (95/52 - 134/99)  BP(mean): 61 (30 Jul 2019 23:05) (61 - 94)  RR: 15 (30 Jul 2019 23:05) (10 - 18)  SpO2: 98% (30 Jul 2019 22:50) (97% - 100%)    NAD, AOx3  Non-labored breathing   Right hip  Dressing C/D/I  EHL/FHL/GA/TA Motor intact  SP/DP/T/S/S SILT distally  Toes WWP    53F POD#0 s/p R PILLO, doing well    Pain control  DVT ppx  IV Abx x24hrs  WBAT  OOBTC/IS  PT/OT  Continue Home meds  AM labs

## 2019-07-31 NOTE — DISCHARGE NOTE PROVIDER - NSDCFUADDINST_GEN_ALL_CORE_FT
keep surgical site clean and dry, may remove dressing  . Call Dr. Rojas if any wound drainage, redness , increasing pain, fevers over 101 or if you have any questions or concerns.    Visiting nurse to remove bandage if applicable. You may shower , do not scrub surgical site. Do not apply any lotions/moisturizers/creams to surgical site.

## 2019-07-31 NOTE — PROGRESS NOTE ADULT - SUBJECTIVE AND OBJECTIVE BOX
ORTHO PROGRESS NOTE       53yFemale POD # 1     S/P right Total Hip Arthroplasty     Patient seen and examined at bedside . The patient is awake and alert in NAD. No complaints of chest pain, SOB, N/V.    Vital Signs Last 24 Hrs  T(C): 36.9 (31 Jul 2019 04:00), Max: 36.9 (31 Jul 2019 04:00)  T(F): 98.4 (31 Jul 2019 04:00), Max: 98.4 (31 Jul 2019 04:00)  HR: 67 (31 Jul 2019 04:00) (60 - 75)  BP: 131/61 (31 Jul 2019 04:00) (95/52 - 134/99)  BP(mean): 68 (30 Jul 2019 23:35) (61 - 94)  RR: 18 (31 Jul 2019 04:00) (10 - 18)  SpO2: 98% (30 Jul 2019 23:35) (97% - 100%)                          10.7   11.28 )-----------( 235      ( 31 Jul 2019 05:34 )             32.4     07-31    140  |  104  |  10  ----------------------------<  106<H>  4.5   |  27  |  0.5<L>    Ca    8.7      31 Jul 2019 05:34            DVT ppx : aspirin     MEDICATIONS  (STANDING):  acetaminophen   Tablet .. 650 milliGRAM(s) Oral every 6 hours  artificial  tears Solution 1 Drop(s) Both EYES daily  aspirin enteric coated 81 milliGRAM(s) Oral every 12 hours  ceFAZolin   IVPB 2000 milliGRAM(s) IV Intermittent every 8 hours  dexamethasone     Tablet 4 milliGRAM(s) Oral once  docusate sodium 100 milliGRAM(s) Oral three times a day  escitalopram 10 milliGRAM(s) Oral daily  ferrous    sulfate 325 milliGRAM(s) Oral three times a day with meals  gabapentin 100 milliGRAM(s) Oral three times a day  hydrochlorothiazide 12.5 milliGRAM(s) Oral daily  ketorolac   Injectable 15 milliGRAM(s) IV Push every 6 hours  losartan 50 milliGRAM(s) Oral daily  multivitamin 1 Tablet(s) Oral daily  pantoprazole    Tablet 40 milliGRAM(s) Oral before breakfast  polyethylene glycol 3350 17 Gram(s) Oral daily  sodium chloride 0.9%. 1000 milliLiter(s) (100 mL/Hr) IV Continuous <Continuous>    MEDICATIONS  (PRN):  aluminum hydroxide/magnesium hydroxide/simethicone Suspension 30 milliLiter(s) Oral four times a day PRN Indigestion  LORazepam     Tablet 0.5 milliGRAM(s) Oral two times a day PRN Anxiety  magnesium hydroxide Suspension 30 milliLiter(s) Oral daily PRN Constipation  ondansetron Injectable 4 milliGRAM(s) IV Push every 6 hours PRN Nausea and/or Vomiting  senna 2 Tablet(s) Oral at bedtime PRN Constipation  traMADol 50 milliGRAM(s) Oral every 6 hours PRN Severe Pain (7 - 10)      PE:   right hip dressing C/D/I          Compartments soft         NVI, SILT           A/P: 53yFemale POD # 1   S/P right Total Hip Arthroplasty             OOB to Chair            Physical Therapy           Pain control            Incentive Spirometry            DVT Prophylaxis            Discharge planning

## 2019-07-31 NOTE — OCCUPATIONAL THERAPY INITIAL EVALUATION ADULT - GROSSLY INTACT, SENSORY
except pt c/o nubness and tingling in fingertips. Pt. stated symptoms were worse in the am. have since resolved. However, pt stated that she also experienced mild numbness/ tingling in digits intermittently PTA./Left UE/Right UE/Grossly Intact

## 2019-07-31 NOTE — DISCHARGE NOTE PROVIDER - HOSPITAL COURSE
s/p Total Hip Arthroplasty , routine post op course s/p right Total Hip Arthroplasty , routine post op course

## 2019-08-01 ENCOUNTER — TRANSCRIPTION ENCOUNTER (OUTPATIENT)
Age: 54
End: 2019-08-01

## 2019-08-01 VITALS
DIASTOLIC BLOOD PRESSURE: 68 MMHG | HEART RATE: 70 BPM | TEMPERATURE: 97 F | RESPIRATION RATE: 18 BRPM | SYSTOLIC BLOOD PRESSURE: 130 MMHG

## 2019-08-01 LAB
HCT VFR BLD CALC: 30.7 % — LOW (ref 37–47)
HGB BLD-MCNC: 10.2 G/DL — LOW (ref 12–16)
MCHC RBC-ENTMCNC: 29.7 PG — SIGNIFICANT CHANGE UP (ref 27–31)
MCHC RBC-ENTMCNC: 33.2 G/DL — SIGNIFICANT CHANGE UP (ref 32–37)
MCV RBC AUTO: 89.5 FL — SIGNIFICANT CHANGE UP (ref 81–99)
NRBC # BLD: 0 /100 WBCS — SIGNIFICANT CHANGE UP (ref 0–0)
PLATELET # BLD AUTO: 229 K/UL — SIGNIFICANT CHANGE UP (ref 130–400)
RBC # BLD: 3.43 M/UL — LOW (ref 4.2–5.4)
RBC # FLD: 13.8 % — SIGNIFICANT CHANGE UP (ref 11.5–14.5)
WBC # BLD: 10.46 K/UL — SIGNIFICANT CHANGE UP (ref 4.8–10.8)
WBC # FLD AUTO: 10.46 K/UL — SIGNIFICANT CHANGE UP (ref 4.8–10.8)

## 2019-08-01 RX ADMIN — Medication 12.5 MILLIGRAM(S): at 05:20

## 2019-08-01 RX ADMIN — TRAMADOL HYDROCHLORIDE 50 MILLIGRAM(S): 50 TABLET ORAL at 09:43

## 2019-08-01 RX ADMIN — Medication 81 MILLIGRAM(S): at 05:19

## 2019-08-01 RX ADMIN — Medication 100 MILLIGRAM(S): at 05:20

## 2019-08-01 RX ADMIN — Medication 325 MILLIGRAM(S): at 08:09

## 2019-08-01 RX ADMIN — Medication 1 TABLET(S): at 11:06

## 2019-08-01 RX ADMIN — CELECOXIB 200 MILLIGRAM(S): 200 CAPSULE ORAL at 11:06

## 2019-08-01 RX ADMIN — LOSARTAN POTASSIUM 50 MILLIGRAM(S): 100 TABLET, FILM COATED ORAL at 05:21

## 2019-08-01 RX ADMIN — TRAMADOL HYDROCHLORIDE 50 MILLIGRAM(S): 50 TABLET ORAL at 04:55

## 2019-08-01 RX ADMIN — PANTOPRAZOLE SODIUM 40 MILLIGRAM(S): 20 TABLET, DELAYED RELEASE ORAL at 08:09

## 2019-08-01 RX ADMIN — Medication 1 DROP(S): at 11:06

## 2019-08-01 RX ADMIN — TRAMADOL HYDROCHLORIDE 50 MILLIGRAM(S): 50 TABLET ORAL at 10:15

## 2019-08-01 RX ADMIN — Medication 650 MILLIGRAM(S): at 05:20

## 2019-08-01 RX ADMIN — Medication 650 MILLIGRAM(S): at 11:06

## 2019-08-01 RX ADMIN — GABAPENTIN 100 MILLIGRAM(S): 400 CAPSULE ORAL at 05:20

## 2019-08-01 RX ADMIN — POLYETHYLENE GLYCOL 3350 17 GRAM(S): 17 POWDER, FOR SOLUTION ORAL at 11:06

## 2019-08-01 RX ADMIN — ESCITALOPRAM OXALATE 10 MILLIGRAM(S): 10 TABLET, FILM COATED ORAL at 11:06

## 2019-08-01 RX ADMIN — TRAMADOL HYDROCHLORIDE 50 MILLIGRAM(S): 50 TABLET ORAL at 04:54

## 2019-08-01 RX ADMIN — Medication 650 MILLIGRAM(S): at 05:21

## 2019-08-01 NOTE — PROGRESS NOTE ADULT - SUBJECTIVE AND OBJECTIVE BOX
ORTHO PROGRESS NOTE       53yFemale POD # 2    S/P right Total Hip Arthroplasty     Patient seen and examined at bedside . Pain controlled. No complaints of chest pain, SOB, N/V.    NAD  Vital Signs Last 24 Hrs  T(C): 36.3 (01 Aug 2019 00:00), Max: 36.6 (31 Jul 2019 16:00)  T(F): 97.4 (01 Aug 2019 00:00), Max: 97.9 (31 Jul 2019 21:00)  HR: 68 (01 Aug 2019 04:20) (68 - 81)  BP: 140/72 (01 Aug 2019 04:20) (110/59 - 140/72)  BP(mean): --  RR: 18 (01 Aug 2019 04:20) (18 - 20)  SpO2: --                       PE:   right hip dressing changed, wound d/c/i         Compartments soft         NVID,  SILT         foot wwp                          10.2   10.46 )-----------( 229      ( 01 Aug 2019 04:54 )             30.7     07-31    140  |  104  |  10  ----------------------------<  106<H>  4.5   |  27  |  0.5<L>    Ca    8.7      31 Jul 2019 05:34        A/P: 53yFemale POD # 2   S/P right Total Hip Arthroplasty             OOB to Chair            Physical Therapy           Pain control            Incentive Spirometry            DVT Prophylaxis            Discharge  later today

## 2019-08-01 NOTE — DISCHARGE NOTE NURSING/CASE MANAGEMENT/SOCIAL WORK - NSDCDPATPORTLINK_GEN_ALL_CORE
You can access the [facility] Patient Portal, offered by [facility], by registering with the following website: [email or web address]

## 2019-08-02 LAB — SURGICAL PATHOLOGY STUDY: SIGNIFICANT CHANGE UP

## 2019-08-13 ENCOUNTER — APPOINTMENT (OUTPATIENT)
Dept: ORTHOPEDIC SURGERY | Facility: CLINIC | Age: 54
End: 2019-08-13
Payer: COMMERCIAL

## 2019-08-13 DIAGNOSIS — Z48.02 ENCOUNTER FOR REMOVAL OF SUTURES: ICD-10-CM

## 2019-08-13 PROCEDURE — 99024 POSTOP FOLLOW-UP VISIT: CPT

## 2019-08-13 RX ORDER — ACETAMINOPHEN 500 MG/1
TABLET, COATED ORAL
Refills: 0 | Status: ACTIVE | COMMUNITY

## 2019-08-13 NOTE — HISTORY OF PRESENT ILLNESS
[de-identified] : 53 year old female s/p right total hip replacement presents to the office today for her 2 week follow up. Pt is here today for staple removal. She is ambulating with a cane. Pt continues to do physical therapy at home with good mobility. She reports only taking Tylenol for pain with good pain control. She does continue to report mild pain, but states this is mostly attributed to her ITBS which is an ongoing issue for her. Pt also reports taking Aspirin for DVT prophylaxis. Pt denies any fevers, chills, drainage from the incision site, chest pain or shortness of breath.

## 2019-08-13 NOTE — PHYSICAL EXAM
[Cane] : ambulates with cane [2+] : right 2+ [Normal] : Alert and in no acute distress [de-identified] : Right Hip\par Inspection: No effusion\par Wounds: Incision is clean and dry, staples are intact\par Alignment: Normal\par Stability: No instability\par Palpation: No specific tenderness on palpation\par Muscle Test: 4/5 All motor groups\par Leg examination: Calf is soft and non-tender. [de-identified] : Sensation grossly intact about bilateral lower extremities.\par

## 2019-08-13 NOTE — REVIEW OF SYSTEMS
[Negative] : Heme/Lymph [Joint Pain] : joint pain [Joint Stiffness] : joint stiffness [Joint Swelling] : no joint swelling

## 2019-08-13 NOTE — ASSESSMENT
[FreeTextEntry1] : 53 year old female approximately two weeks status post right total hip replacement. Staples were removed from the incision site and steri-strips were applied. Pt was instructed he may shower, allowing the soap and water to run over the incision site, but not to directly scrub over the wound. Pt is to continue Aspirin 81 mg twice daily for DVT prophylaxis. Pt is to follow up in four weeks for reassessment. In the interim, if she develops any fevers, erythema, drainage from the incision site, or any concerning symptoms, we will see her prior to her scheduled appointment.\par

## 2019-09-09 ENCOUNTER — APPOINTMENT (OUTPATIENT)
Dept: ORTHOPEDIC SURGERY | Facility: CLINIC | Age: 54
End: 2019-09-09
Payer: COMMERCIAL

## 2019-09-09 DIAGNOSIS — M70.61 TROCHANTERIC BURSITIS, RIGHT HIP: ICD-10-CM

## 2019-09-09 PROCEDURE — 99024 POSTOP FOLLOW-UP VISIT: CPT

## 2019-09-09 RX ORDER — LOSARTAN POTASSIUM 50 MG/1
50 TABLET, FILM COATED ORAL
Refills: 0 | Status: DISCONTINUED | COMMUNITY
End: 2019-09-09

## 2019-09-09 NOTE — PHYSICAL EXAM
[de-identified] : Right Hip\par Inspection: No effusion\par Wounds: Healed incision about the left hip, no drainage or erythema\par Alignment: Normal\par Stability: No instability\par Palpation: tenderness over the greater trochanter and more posterior\par ROM: 75 degrees flexion, 30 abduction, 30 external rotation \par Muscle Test: 5/5 All motor groups\par Leg examination: Calf is soft and non-tender. [de-identified] : Radiographs done today AP pelvis and lateral of the right hip shows the bony structures are intact there is a well aligned cementless hip replacement on the right.

## 2019-09-09 NOTE — HISTORY OF PRESENT ILLNESS
[de-identified] : 53 year old female s/p right total hip replacement presents to the office today for her 6 week follow up. Pt is ambualting without a walker, cane, or crutch. She has completed physical therapy at this time and reports great mobility. Pt had gone back to work and reports feeling good. Pt denies any pain. She is able to ambulate unlimited distances and is negotiating stairs well. Pt does complain of some pain radiating from her lower back/buttock area to her right knee but attributes this to her ITB syndrome . She reports taking Tylenol Extra strength occasionally for pain. Overall, patient reports doing very well.

## 2019-09-09 NOTE — ASSESSMENT
[FreeTextEntry1] : S/p RTH 6 weeks, doing well. Pt complains of pain over the greater trochanter and more posteriorly. The posterior pain is relieved by a piriformis muscle stretch. Pt ambulates without a limp. My impression is shes doing well s/p 6 weeks, she has complaints consistent with trochanteric bursitis and a tight piriformis muscle. Plan is physical therapy, fu 2 months.

## 2019-09-09 NOTE — REASON FOR VISIT
[Post Operative Visit] : a post operative visit for [Joint Replacement Surgery, Aftercare] : joint replacement surgery, aftercare

## 2019-09-23 ENCOUNTER — APPOINTMENT (OUTPATIENT)
Dept: GASTROENTEROLOGY | Facility: CLINIC | Age: 54
End: 2019-09-23
Payer: COMMERCIAL

## 2019-09-23 VITALS
SYSTOLIC BLOOD PRESSURE: 140 MMHG | HEART RATE: 86 BPM | WEIGHT: 204 LBS | DIASTOLIC BLOOD PRESSURE: 90 MMHG | HEIGHT: 63 IN | BODY MASS INDEX: 36.14 KG/M2

## 2019-09-23 DIAGNOSIS — Z87.2 PERSONAL HISTORY OF DISEASES OF THE SKIN AND SUBCUTANEOUS TISSUE: ICD-10-CM

## 2019-09-23 DIAGNOSIS — Z86.79 PERSONAL HISTORY OF OTHER DISEASES OF THE CIRCULATORY SYSTEM: ICD-10-CM

## 2019-09-23 DIAGNOSIS — Z78.9 OTHER SPECIFIED HEALTH STATUS: ICD-10-CM

## 2019-09-23 DIAGNOSIS — B96.81 GASTRITIS, UNSPECIFIED, W/OUT BLEEDING: ICD-10-CM

## 2019-09-23 DIAGNOSIS — K29.70 GASTRITIS, UNSPECIFIED, W/OUT BLEEDING: ICD-10-CM

## 2019-09-23 PROCEDURE — 99214 OFFICE O/P EST MOD 30 MIN: CPT

## 2019-09-23 RX ORDER — RANITIDINE HCL 150 MG
CAPSULE ORAL
Refills: 0 | Status: ACTIVE | COMMUNITY

## 2019-09-23 RX ORDER — LORAZEPAM 0.5 MG/1
0.5 TABLET ORAL
Refills: 0 | Status: ACTIVE | COMMUNITY
Start: 2019-01-28

## 2019-09-23 RX ORDER — LOSARTAN POTASSIUM AND HYDROCHLOROTHIAZIDE 12.5; 1 MG/1; MG/1
100-12.5 TABLET ORAL
Refills: 0 | Status: ACTIVE | COMMUNITY

## 2019-09-23 RX ORDER — HYDROCHLOROTHIAZIDE 12.5 MG/1
12.5 TABLET ORAL
Refills: 0 | Status: DISCONTINUED | COMMUNITY
End: 2019-09-23

## 2019-09-23 RX ORDER — CALCIUM CARBONATE 500 MG/1
TABLET, CHEWABLE ORAL
Refills: 0 | Status: ACTIVE | COMMUNITY

## 2019-09-23 RX ORDER — LOSARTAN POTASSIUM 100 MG/1
100 TABLET, FILM COATED ORAL
Refills: 0 | Status: DISCONTINUED | COMMUNITY
End: 2019-09-23

## 2019-09-23 RX ORDER — AMOXICILLIN 500 MG/1
500 CAPSULE ORAL TWICE DAILY
Qty: 56 | Refills: 0 | Status: ACTIVE | COMMUNITY
Start: 2019-09-23 | End: 1900-01-01

## 2019-09-23 NOTE — PHYSICAL EXAM
[General Appearance - Alert] : alert [Sclera] : the sclera and conjunctiva were normal [Neck Appearance] : the appearance of the neck was normal [Thyroid Diffuse Enlargement] : the thyroid was not enlarged [Heart Sounds] : normal S1 and S2 [Bowel Sounds] : normal bowel sounds [Abdomen Tenderness] : non-tender [] : no hepato-splenomegaly [Abdomen Soft] : soft [Abdomen Mass (___ Cm)] : no abdominal mass palpated [No Spinal Tenderness] : no spinal tenderness [Abnormal Walk] : normal gait [Skin Color & Pigmentation] : normal skin color and pigmentation [Oriented To Time, Place, And Person] : oriented to person, place, and time

## 2019-09-23 NOTE — HISTORY OF PRESENT ILLNESS
[de-identified] : 53 year old female pt with hx of GERD and dysphagia, as well as colon polyps s/p:\par 1- EGD showing HP gastritis, no IM\par 2- Colonoscopy: unremarkable\par No complaints

## 2019-09-23 NOTE — ASSESSMENT
[FreeTextEntry1] : 53 year old female pt with hx of GERD and dysphagia, as well as colon polyps s/p:\par 1- EGD showing HP gastritis, no IM\par 2- Colonoscopy: unremarkable\par No complaints\par \par Rec: triple therapy\par UBT in 3 weeks after d/c\par will call with results\par colonoscopy in 5-7 years

## 2019-11-18 ENCOUNTER — APPOINTMENT (OUTPATIENT)
Dept: ORTHOPEDIC SURGERY | Facility: CLINIC | Age: 54
End: 2019-11-18

## 2020-01-16 RX ORDER — CLARITHROMYCIN 500 MG/1
500 TABLET, FILM COATED ORAL TWICE DAILY
Qty: 28 | Refills: 0 | Status: DISCONTINUED | COMMUNITY
Start: 2019-09-23 | End: 2020-01-16

## 2020-01-16 RX ORDER — PANTOPRAZOLE 40 MG/1
40 TABLET, DELAYED RELEASE ORAL TWICE DAILY
Qty: 28 | Refills: 0 | Status: DISCONTINUED | COMMUNITY
Start: 2019-09-23 | End: 2020-01-16

## 2020-01-17 ENCOUNTER — APPOINTMENT (OUTPATIENT)
Dept: ORTHOPEDIC SURGERY | Facility: CLINIC | Age: 55
End: 2020-01-17
Payer: COMMERCIAL

## 2020-01-17 DIAGNOSIS — Z96.641 PRESENCE OF RIGHT ARTIFICIAL HIP JOINT: ICD-10-CM

## 2020-01-17 PROCEDURE — 99213 OFFICE O/P EST LOW 20 MIN: CPT

## 2020-01-17 PROCEDURE — 73502 X-RAY EXAM HIP UNI 2-3 VIEWS: CPT | Mod: RT

## 2020-01-17 NOTE — HISTORY OF PRESENT ILLNESS
[de-identified] : 54 year old female s/p right total hip replacement presents to the office today for her 6 month follow up. Pt is ambulating without a walker, cane, or crutch. She reports having strained her muscle about 2 weeks ago while dancing, but denies any pain otherwise. Pt reports being limited in ambulation due to her other conditions. She continues to work on negotiating stairs. Pt reports doing well overall.

## 2020-01-17 NOTE — PHYSICAL EXAM
[de-identified] : Right Hip\par Inspection: No effusion\par Wounds: Healed incision about the left hip, no drainage or erythema\par Alignment: Normal\par Stability: No instability\par Palpation: No specific tenderness on palpation\par ROM: Flexion to 80 degrees, ER 30 degrees, ABD 40 degrees \par Muscle Test: 5/5 All motor groups\par Leg examination: Calf is soft and non-tender.\par \par Left hip:\par ROM: Flexion to 80 degrees, ER 30 degrees, ABD 40 degrees  [de-identified] : Radiographs done today AP pelvis and lateral of the right hip shows well aligned cementless hip replacement on the right.

## 2020-01-17 NOTE — ASSESSMENT
[FreeTextEntry1] : S/p RTH 6 months doing well with regards to her hip. Pt has ongoing issues with ITB and recently injured her hamstring. She was given a prescription for PT to address both of those. We will see her back in 2 years time for her hip replacement

## 2020-02-11 NOTE — H&P PST ADULT - BP NONINVASIVE SYSTOLIC (MM HG)
POCUS: Emergency Department Focused Ultrasound performed at patient's bedside.  The complete report may be available in PACS, see below for findings.   Patient/Family and treating team aware of the following: Have DVT study repeated in 5-7 days for completion of your evaluation, one may either go to a vascular lab arranged by a primary medical doctor or return to the emergency department if one is unable to obtain one. 145

## 2021-02-18 NOTE — OCCUPATIONAL THERAPY INITIAL EVALUATION ADULT - BED MOBILITY/TRANSFERS, PREVIOUS LEVEL OF FUNCTION, OT EVAL
Patient:   SHARLA OLEARY            MRN: [id number]            FIN: [id number]               Age:   28 years     Sex:  Female     :  87   Associated Diagnoses:   None   Author:   Ramiro Martinez DO      Basic Information   Time seen: Date & time 10/20/17 17:51:00.   History source: Patient.   Arrival mode: Private vehicle.   History limitation: None.   Additional information: Chief Complaint from Nursing Triage Note : Chief Complaint   10/20/17 17:56           Chief Complaint           Pt to ED w c/o abd pain/pain to incision site on abd. Pt with hx of hysterectomy on tuesday  .      History of Present Illness   28-year-old female presents to the Emergency Department for evaluation of abdominal pain. The patient states she had surgery three days ago and had her left ovary removed along with her cervix and fallopian tubes. She reports having a cyst removed from her ovary a month ago. She states her pain started this morning and they felt similar to the symptoms of the cyst which she had removed. The patient describes her pain as sharp and a burning sensation radiating from her right lower quadrant to the right lower back. She notes having a bowel movement after arriving to the ED and noticed some vaginal bleeding in her underwear. The patient admits to having hot flashed, chills, and nausea.       Review of Systems   Constitutional symptoms:  No fever,    Skin symptoms:  No rash,    Eye symptoms:  No recent vision problems,    ENMT symptoms:  No sore throat,    Respiratory symptoms:  No shortness of breath,    Cardiovascular symptoms:  No chest pain,    Gastrointestinal symptoms:  Abdominal pain, sharp, burning, vaginal bleed, nausea.    Genitourinary symptoms:  No dysuria,    Musculoskeletal symptoms:  No Joint pain,    Neurologic symptoms:  No headache,              Additional review of systems information: All other systems reviewed and otherwise negative.      Health Status   Allergies: No known  allergies.   Medications:  (Selected)   Prescriptions  Prescribed  ProAir HFA 90 mcg/inh inhalation aerosol: 2 puff(s), INHL, q4hr, PRN: for wheezing, 8.5 g, 0 Refill(s)  Documented Medications  Documented  Norco 325 mg-5 mg oral tablet: 2 tab(s), PO, q6hr, 20 tab, PRN: for pain  Prenatal Multivitamins: PO, qDay.      Past Medical/ Family/ Social History      Medical history   Cardiovascular: no coronary artery disease, no hypertension.   Endocrine: no diabetes.   Surgical history:  Appendectomy, cholecystectomy, bilateral tubal ligation, tonsillectomy.   .   Social history: Alcohol use: Occasionally, Tobacco use: Regularly, Drug use: Denies, Occupation: Employed, Family/social situation: , intact family.      Physical Examination               Vital Signs   Vital Signs   10/20/17 17:28           Temperature Oral          98.1 F                             Peripheral Pulse Rate     95 bpm                             Respiratory Rate          18 br/min                             Systolic Blood Pressure   115 mmHg                             Diastolic Blood Pressure  76 mmHg                             Mean Arterial Pressure    89 mmHg                             Oxygen Saturation         100 %  .               Per nurse's notes.              Oxygen saturation:  100 %.   Vital Signs were reviewed.   General:  Alert, no acute distress.    Skin:  Warm, dry.    Head:  Normocephalic, atraumatic.    Neck:  Supple, trachea midline.    Eye:  Pupils are equal, round and reactive to light, extraocular movements are intact.    Ears, nose, mouth and throat:  Oral mucosa moist.   Cardiovascular:  Regular rate and rhythm, S1, S2.    Respiratory:  Lungs are clear to auscultation, respirations are non-labored, Symmetrical chest wall expansion.    Gastrointestinal:  Soft, Non distended, Low transverse abdominal incision which is clean dry and intact and held in place with multiple staples, Moderate  diffuse abdominal TTP.     Musculoskeletal:  Normal ROM, no swelling, no deformity.    Neurological:  Alert and oriented to person, place, time, and situation, No focal neurological deficit observed.    Psychiatric:  Appropriate mood & affect.      Medical Decision Making   Results review:  Lab results : Lab View   10/20/17 18:00           Lactic Acid               1.2 mmol/L    10/20/17 17:55           Glucose Lvl               94 mg/dL                             BUN                       7 mg/dL                             Creatinine                0.70 mg/dL                             eGFR AfrAmer              >60 mL/min/1.73m2  NA                             eGFR NonAfrAmer           >60 mL/min/1.73m2  NA                             Sodium                    138 mEq/L                             Potassium                 3.2 mEq/L  LOW                             Chloride                  102 mEq/L                             TCO2                      28 mEq/L                             AGAP                      11 mEq/L                             Calcium                   9.7 mg/dL                             WBC                       10.4 K/cumm                             RBC                       3.81 M/cumm                             Hgb                       11.0 g/dL                             Hct                       34 %                             MCV                       88 FL                             MCH                       29 pg                             MCHC                      33 g/dL                             RDW                       12.6 %                             Platelet                  223 K/cumm                             NRBC                      0.0 %                             Abs Neutro                6.5 K/cumm                             Neutrophil                63 %  NA                             Abs Lymph                 2.9 K/cumm                             Lymphocyte                 28 %  NA                             Abs Mono                  0.7 K/cumm                             Monocyte                  7 %  NA                             Immature Gran             0.3 %                             Eosinophil                2 %                             Basophil                  0 %                             UA Appear                 Clear                             UA Color                  Yellow                             UA pH                     8.0  HI                             UA Spec Grav              1.010                             UA Glucose                Negative                             UA Bili                   Negative                             UA Ketones                Negative                             UA Blood                  2+                             UA Protein                Negative                             UA Urobilinogen           0.2 mg/dL                             UA Nitrite                Negative                             UA Leuk Est               2+                             UA Epithelial             1 - 5/HPF                             UA RBC                    Rare                             UA WBC                    6 - 10/HPF                             UA Bacteria               Few                             UA Yeast                  Few  .   Radiology results:             Result type: CT Abdomen + Pelvis w / Contrast  Result date: 20 October 2017 18:34    Verified by: Sathish DOTY [facility] on 20 October 2017 19:03       Report  EXAM: CT Abdomen + Pelvis w / Contrast    TECHNIQUE: CT Abdomen + Pelvis w / Contrast. CT of the abdomen and pelvis was performed a with intravenous contrast. Axial and coronal images are generated. [employer] of the Omnipaque 350 used: 75 .Adjustment of the mA and/or kV was performed based on the patient's size to minimize radiation dose.    CLINICAL INDICATION:  Abdominal Pain    FINDINGS:    Liver,  spleen, pancreas and adrenal glands appear normal.  Cholecystectomy.  Surgical clips in the right lower quadrant of the abdomen.  Skin staples in the anterior inferior abdominal wall.  Inflammatory changes and some gas density in the superficial abdominal wall tissues.  No bowel obstruction.  No free peritoneal gas.  Small amount of peritoneal fluid in the right lower quadrant of the abdomen.  Uterus not identified.  Please correlate with the surgical history.  There is a no visualization of the right distal ureter.  The proximal right ureter shows some moderate hydroureter.  There is no obstructive the nephrogram.  Kidneys otherwise appear unremarkable.  Appendix not clearly delineated.    IMPRESSION:     Small amount of peritoneal fluid in the right lower quadrant of the abdomen.  Uterus not identified.  Please correlate with the surgical history.  There is a no visualization of the right distal ureter.  The proximal right ureter shows some moderate hydroureter.  There is no obstructive the nephrogram.  Kidneys otherwise appear unremarkable.  Appendix not clearly delineated.  ,            Result type: US Pelvic Transabdominal  Result date: 20 October 2017 19:14    Verified by: Ivan Bower MD on 20 October 2017 19:20       Report  US Pelvic Transabdominal, US Pelvic Transvaginal    TECHNIQUE: Ultrasonography of the pelvis was performed. Both spectral and color-flow Doppler was utilized to a evaluate for arterial and venous blood flow.  US Pelvic Transabdominal, US Pelvic Transvaginal    CLINICAL INDICATION:  Abdominal Pain    COMPARISON: None.    FINDINGS:    Uterus:    Not identified.    Right ovary and adnexal region:    There is Doppler evidence of blood flow to the right ovary.  Right ovary measurements in centimeters: 4.6 x 3.1 x 3.7  Right ovarian cysts: Complex cyst measuring 2.5 x 1.4 x 1.4 cm.      Left ovary and adnexal region:    Identified.  Pelvic fluid: None    IMPRESSION:    Complex cyst in the right  ovary.    Please read full report.  ,            Result type: US Pelvic Transvaginal  Result date: 20 October 2017 19:14    Verified by: Sathish DOTY, [facility] on 20 October 2017 19:20       Report  US Pelvic Transabdominal, US Pelvic Transvaginal    TECHNIQUE: Ultrasonography of the pelvis was performed. Both spectral and color-flow Doppler was utilized to a evaluate for arterial and venous blood flow.  US Pelvic Transabdominal, US Pelvic Transvaginal    CLINICAL INDICATION:  Abdominal Pain    COMPARISON: None.    FINDINGS:    Uterus:    Not identified.    Right ovary and adnexal region:    There is Doppler evidence of blood flow to the right ovary.  Right ovary measurements in centimeters: 4.6 x 3.1 x 3.7  Right ovarian cysts: Complex cyst measuring 2.5 x 1.4 x 1.4 cm.      Left ovary and adnexal region:    Identified.  Pelvic fluid: None    IMPRESSION:    Complex cyst in the right ovary.    Please read full report.  .    Notes:  28-year-old female that presents with diffuse abdominal pain worse in the right lower quadrant.  She is postoperative from partial hysterectomy with a residual right ovary in place.  Examination notable for diffuse abdominal tenderness, incisional wound unremarkable.  No signs of infection.  Mr. Abel Steen CT scan of the abdomen was performed no signs of bowel obstruction or mass.  Pelvic ultrasound obtained to evaluate for ovarian torsion.  No torsion noted and cysts noted that may be attributing to the patient's symptomatology.  Differential diagnosis also includes appendicitis however the patient has normal WBC, no fever and no symptoms likely due to more postoperative   State.  Pain and nausea medication given to the patient with improvement of her symptoms.  I spoke with Dr. Gustabo Rosario who is the OB/GYN physician that performed the surgery and is aware of the patient.  His lab work and imaging unremarkable at this time, patient symptomatically improved.  Will discharge home with pain  medication that she has at home as well as Phenergan.  Strict verbal discharge and return precautions were discussed with the patient..      Procedure   Pulse Ox Interpretation  Measurement: 100%  Oxygen: Room Air  Interpretation: Normal  Interpreted by: Ramiro Martinez DO       Impression and Plan   Diagnosis   Abdominal Pain  Nausea and vomiting   Plan   Condition: Stable.    Disposition: Discharged: Time  10/20/17 20:28:00, to home.    Prescriptions: [facility] prescriptions   Pharmacy:  promethazine 12.5 mg oral tablet (Prescribe): 12.5 mg, 1 tab, PO, q6hr, for 5 day, [id number], PRN:  Nausea, 10 tab, 0 Refill(s).    Patient was given the following educational materials: Incision Care, Abdominal Pain, Adult.    Follow up with: Gustabo Rosario Within 3 to 5 days Call for follow up appointment; Return to Emergency Department Within As needed Return if symptoms worsen, Return to Emergency Department Within As needed Return if symptoms worsen; Gustabo Rosario Within 3 to 5 days Call for follow up appointment.    Counseled: Patient, Regarding diagnosis, Regarding treatment plan.    Notes: This encounter was documented by Celine Velasco for Dr. Martinez., All medical enteries record made by the scribe were at my direction. I have reviewed the chart and agreed that the record accurately reflects my personal performance history, physical exam, mental dicision making, and the emergency course for this patient. I have also personally directed, reviewed and agreed with the discharge instruction and decision. .         independent

## 2021-07-14 NOTE — ASU PREOP CHECKLIST - RESPIRATORY RATE (BREATHS/MIN)
[facility] Hospitalist Internal Medicine Progress Note      Subjective    No acute events overnight. Denies fevers, chills, chest pains, nausea or emesis. Does have some dyspnea, especially with exertion. Dyspnea is slowly getting better.    Review of Systems    Constitutional: No fevers, chills.  Skin: No rashes.  Eyes: No recent vision problems.   ENT: No sore throat  Cardiovascular: No chest pains.  Respiratory: Some shortness of breath.  Gastrointestinal: No nausea, no vomiting or diarrhea. No abdominal pain.  Musculoskeletal: No leg pain.   Neurologic: No headache.  Hematologic: No unusual bruising or bleeding.      I/O's    Intake/Output Summary (Last 24 hours) at 7/14/2021 1653  Last data filed at 7/14/2021 0600  Gross per 24 hour   Intake --   Output 600 ml   Net -600 ml       ALLERGIES:  Atropine, Benadryl allergy, Caffeine, Codeine, Metformin, and Mustard   (food or med)     Hospital Meds  Current Facility-Administered Medications   Medication   • [START ON 7/15/2021] predniSONE (DELTASONE) tablet 20 mg   • [START ON 7/15/2021] insulin glargine (LANTUS) injection 8 Units   • [START ON 7/15/2021] insulin lispro (ADMELOG, HumaLOG) injection 3 Units   • benzonatate (TESSALON PERLES) capsule 100 mg   • fluticasone (FLONASE) 50 MCG/ACT nasal spray 2 spray   • insulin lispro (ADMELOG,HumaLOG) - Correction Dose   • ipratropium-albuterol (DUONEB) 0.5-2.5 (3) MG/3ML nebulizer solution 3 mL   • pantoprazole (PROTONIX) EC tablet 40 mg   • lidocaine (LIDOCARE) 4 % patch 1 patch   • [Held by provider] glipiZIDE (GLUCOTROL) tablet 5 mg   • amLODIPine (NORVASC) tablet 2.5 mg   • ferrous sulfate (65 mg Fe per 325 mg) tablet 325 mg   • hydrochlorothiazide (HYDRODIURIL) tablet 12.5 mg   • levothyroxine (SYNTHROID, LEVOTHROID) tablet 100 mcg   • losartan (COZAAR) tablet 50 mg   • pravastatin (PRAVACHOL) tablet 40 mg   • sodium chloride (NORMAL SALINE) 0.9 % bolus 500 mL   • sodium chloride 0.9 % flush bag 25 mL   • sodium chloride  (PF) 0.9 % injection 2 mL   • Potassium Standard Replacement Protocol   • Magnesium Standard Replacement Protocol   • ondansetron (ZOFRAN) injection 4 mg   • acetaminophen (TYLENOL) tablet 650 mg   • polyethylene glycol (MIRALAX) packet 17 g   • sodium chloride 0.9 % flush bag 25 mL   • enoxaparin (LOVENOX) injection 40 mg   • albuterol inhaler 2 puff   • dextrose 50 % injection 25 g   • dextrose 50 % injection 12.5 g   • glucagon (GLUCAGEN) injection 1 mg   • dextrose (GLUTOSE) 40 % gel 15 g   • dextrose (GLUTOSE) 40 % gel 30 g   • insulin lispro (ADMELOG,HumaLOG) - Correction Dose       Last Recorded Vitals      Vital Last Value 24 Hour Range   Temperature 98.1 °F (36.7 °C) (07/14/21 1613) Temp  Min: 97.2 °F (36.2 °C)  Max: 98.1 °F (36.7 °C)   Pulse 73 (07/14/21 1613) Pulse  Min: 71  Max: 91   Respiratory 18 (07/14/21 1613) Resp  Min: 16  Max: 18   Non-Invasive  Blood Pressure 116/66 (07/14/21 1613) BP  Min: 97/50  Max: 135/72   Pulse Oximetry 98 % (07/14/21 1613) SpO2  Min: 97 %  Max: 100 %   Arterial   Blood Pressure   No data recorded        Physical Exam   General: Awake, Alert, NAD.  Head: Normocephalic, Atraumatic.  Eyes: Anicteric eyes.  Neck: Supple, non-tender. No cervical LAD.  ENT: MMM.  Pulm: No tachypnea or increased work of breathing.  Cardiac: RRR.  Abdomen: Soft, non-tender, non-distended abdomen.  Musculoskeletal: Warm, well perfused, non-tender, non-edematous LEs.  Skin: No facial rash.  Neuro: Alert and interactive. Answering questions and following commands.   Psych: Cooperative    Labs   CBC  Recent Labs   Lab 07/14/21  0452 07/13/21  0534 07/12/21  0538 07/09/21  0510 07/08/21  1159   WBC 8.0 8.1 8.1 5.8 4.8   RBC 3.63* 3.47* 3.41* 3.58* 3.61*   HGB 11.4* 10.9* 10.8* 11.3* 11.5*   HCT 35.1* 33.3* 32.2* 34.3* 34.9*   MCV 96.7 96.0 94.4 95.8 96.7   MCH 31.4 31.4 31.7 31.6 31.9   MCHC 32.5 32.7 33.5 32.9 33.0   RDW-CV 12.8 12.5 12.2 12.3 12.4    257 247 242 243   Lymphocytes, Percent 26   --   --  41 38     CMP  Recent Labs   Lab 21  0452 21  0535 21  0538 07/10/21  0525   Sodium 138 134* 136 141   Chloride 102 103 101 105   BUN 44* 52* 46* 18   BUN/ Creatinine Ratio 92* 68* 58* 31*   Potassium 3.9 3.9 4.0 4.1   Glucose 229* 231* 242* 122*   Creatinine 0.48* 0.76 0.79 0.59   Calcium 8.6 9.3 8.8 9.0         Blood Culture:  No results found for this or any previous visit.  Urine Culture:  No results found for this or any previous visit.  Cultures  Microbiology Results     None           Imaging  TRANSTHORACIC ECHO (TTE) COMPLETE W/ W/O IMAGING AGENT  *[facility]*  [address] [place] [address]  Transthoracic Echocardiogram (TTE)    Patient: Enedelia London    Study Date/Time:    2021 9:10AM  MRN:     [id number]                FIN#:               [id number]  :     1937             Ht/Wt:              154.9cm 88.5kg  Age:     84                     BSA/BMI:            2m^2 36.8kg/m^2  Gender:  F                      Baseline BP:        127 / 75  Ordering Physician:   Bryson Willis     Referring Physician:  Bryson Willis     Attending Physician:  Lewis Hardwick  Performing Physician: [facility]  Diagnostic Physician: Kwame Sommers MD  Sonographer:          Nilsa Lyles     --------------------------------------------------------------------------  INDICATIONS:   Dyspnea.    --------------------------------------------------------------------------  STUDY CONCLUSIONS  SUMMARY:    1. Left ventricle: The cavity size is normal. There is concentric     hypertrophy. Systolic function is normal. Wall motion is normal; there     are no regional wall motion abnormalities. Doppler parameters are     consistent with abnormal left ventricular relaxation (grade 1 diastolic     dysfunction). The ejection fraction is 66%.  2. Mitral valve: Trivial regurgitation.  3. Tricuspid valve: Mild regurgitation.  4. Left atrium: The  atrium is dilated.    --------------------------------------------------------------------------  STUDY DATA:   Procedure:  Transthoracic echocardiography was performed.  Image quality was good.  M-mode, complete 2D, complete spectral Doppler,  and color Doppler.  Study status:  Routine.  Study completion:  There were  no complications.    FINDINGS    LEFT VENTRICLE:  The cavity size is normal. There is concentric  hypertrophy. Systolic function is normal. Wall motion is normal; there are  no regional wall motion abnormalities.    The ejection fraction was  measured by biplane method of disks. The ejection fraction is 66%. Doppler  parameters are consistent with abnormal left ventricular relaxation (grade  1 diastolic dysfunction).    AORTIC VALVE:   Structurally normal valve. The valve is trileaflet.   Cusp  separation is normal.  Doppler:  Transvalvular velocity is within the  normal range. There is no stenosis.  No regurgitation.    MITRAL VALVE:  The leaflets are mildly thickened. Leaflet separation is  normal.  Doppler:  Transvalvular velocity is within the normal range.  There is no evidence for stenosis.  Trivial regurgitation.    LEFT ATRIUM:  The atrium is dilated.    RIGHT VENTRICLE:  The cavity size is dilated. Wall thickness is normal.  Systolic function is normal.    PULMONIC VALVE:   Not visualized.  Doppler:   No significant  regurgitation.    TRICUSPID VALVE:   Structurally normal valve.   Leaflet separation is  normal.  Doppler:  Transvalvular velocity is within the normal range.  There is no evidence for stenosis.  Mild regurgitation.    RIGHT ATRIUM:  The atrium is dilated.    PERICARDIUM:  There is no pericardial effusion.    SYSTEMIC VEINS:  Inferior vena cava: The vessel is normal in size. The respirophasic  diameter changes are in the normal range (greater than or equal to 50%).    --------------------------------------------------------------------------  Measurements     Left ventricle               Value        Ref        Right ventricle             Value        Ref   MARIEL, LAX chord              4.7   cm     3.8 - 5.2  MARIEL, LAX                    3.2   cm     ---------   ESD, LAX chord              2.9   cm     2.2 - 3.5   MARIEL/bsa, LAX chord          2.3   cm/m^2 2.3 - 3.1  Left atrium                 Value        Ref   ESD/bsa, LAX chord          1.5   cm/m^2 1.3 - 2.1  AP dim, ES                  3.6   cm     2.7 - 3.8   PW, ED, LAX        (H)      1.0   cm     0.6 - 0.9  AP dim index                1.8   cm/m^2 1.5 - 2.3   PW, ED             (H)      1.0   cm     0.6 - 0.9  Area ES, A4C                16    cm^2   <=20   IVS/PW, ED                  0.95         ---------  Vol, S               (H)    54    ml     22 - 52   EDV                         102   ml     46 - 106   Vol/bsa, S                  27    ml/m^2 16 - 34   ESV                         34    ml     14 - 42    Vol, ES, 1-p A4C            43    ml     22 - 52   EF                          66    %      54 - 74    Vol/bsa, ES, 1-p A4C        21    ml/m^2 11 - 40   SV                          68    ml     ---------  Vol, ES, 1-p A2C     (H)    57    ml     22 - 52   EDV/bsa                     51    ml/m^2 29 - 61    Vol/bsa, ES, 1-p A2C        29    ml/m^2 13 - 40   ESV/bsa                     17    ml/m^2 8 - 24   SV/bsa                      34    ml/m^2 ---------  Mitral valve                Value        Ref   ESV, 1-p A4C                16    ml     12 - 60    Peak E                      0.48  m/sec  ---------   SV, 1-p A4C                 35    ml     ---------  Peak A                      0.64  m/sec  ---------   ESV/bsa, 1-p A4C            8     ml/m^2 7 - 35     Decel time                  232   ms     ---------   SV/bsa, 1-p A4C             17    ml/m^2 ---------  Peak E/A ratio              0.75         ---------   EDV, 2-p                    60    ml     46 - 106   ESV, 2-p                    21    ml     14 - 42    Tricuspid  valve             Value        Ref   EDV/bsa, 2-p                30    ml/m^2 29 - 61    TR peak v                   2.5   m/sec  <=2.8   ESV/bsa, 2-p                10    ml/m^2 8 - 24     Peak RV-RA grad, S          24    mm Hg  ---------      Ventricular septum          Value        Ref        Aortic root                 Value        Ref   IVS, ED            (H)      1.0   cm     0.6 - 0.9  Root diam                   2.5   cm     <4.1     Legend:  (L)  and  (H)  crystal values outside specified reference range.    Prepared and electronically signed by  Kwame Sommers MD  07/13/2021 14:47       Assessment/Plan    1. Acute respiratory failure with hypoxia:  CTA negative. Procal negative. No symptoms of URI. Unclear etiology. Monitor off of abx. Consulted pulm.  Discussed with pulm. Autoimmune or COOP. Labs do not suggest auto-immune process, but cryptogenic organizing PNA still possible. On prednisone, being tapered per pulm. recs. Pt. Underwent bronchoscopy on 07/13. No obvious obstruction or mass etc noted. Steroids decreased to 20. Pt. Cleared by pulm. For discahrge. Appreciate recs. CTM.     2. DM-II: On SSI and accechecks. On Lantus 8 units and Lispro 3 units TiDcc. Will adjsut insulin and meds as needed. CTM.      3. Essential hypertension: BP controlled. Continue amlodipine 2.5 mg, hydrochlorothiazide and losartan. CTM.     4. Hypercholesterolemia: Continue pravastatin.     5. Primary hypothyroidism: Continue levothyroxine. Checked TSH and its normal. CTM.     6. Post-nasal drip: Runny nose/congestion likely due to O2 via NC. Flonase ordered. CTM.     7. Anemia: Normocytic. No overt bleeding. Likely related to anemia of chronic disease. Hb 11.4 on 07/14. CTM.     8. PPx: Lovenox.     9. FEN: Cardiac diabetic diet.     Dispo: Stable. On tele.    Therapy Orders:  PT and OT Orders Placed this Encounter   Procedures   • Occupational Therapy   • Physical Therapy   • Physical Therapy       IP CONSULT TO  PULMONOLOGY  IP CONSULT TO SOCIAL WORK    Primary Care Physician  MD Lewis Young MD  [facility] Hospitalist  7/14/2021 4:53 PM         17

## 2022-08-24 NOTE — H&P PST ADULT - GUM GEN PE MLT EXAM PC
Patients spouse called stating that Trelegy is too expensive and hey cannot afford it - Would like to know if they is a cheaper alternative - Pls call [phone] - They use [employer] in [place]   not examined

## 2022-11-15 NOTE — PATIENT PROFILE ADULT - ANY IMPAIRED UPPER EXTREMITY FUNCTION WITHIN A WEEK PRIOR TO ADMISSION RELATED TO?
35 yo f p/w cough.  Denies: f/c, rhinorrhea, sob, cp.   Lungs CTA  pmh asthma, dm      Patient was seen for triage purposes only.  My evaluation is not meant to provide definitive treatment not is this not intended to diagnose or manage the patient. Disposition is per the treatment team unless otherwise stated.      Maria De Jesus Erazo PA-C  11/14/22 2032     no

## 2023-09-10 ENCOUNTER — NON-APPOINTMENT (OUTPATIENT)
Age: 58
End: 2023-09-10

## 2024-02-09 NOTE — H&P PST ADULT - SKIN
Eloisa is doing well.  She has no evidence of recurrent  or residual disease.  I asked her to see me in 6 months.   No lesions; no rash

## 2024-11-08 NOTE — ASU PREOP CHECKLIST - VERIFY SURGICAL SITE/SIDE WITH PATIENT
Referral for procedure from PAT appointment      Spoke to pt to schedule procedure(s) Colonoscopy       Physician to perform procedure(s) Dr. SEBAS Lester  Date of Procedure (s) 12/23/24  Arrival Time 10:00 AM  Time of Procedure(s) 11:00 AM   Location of Procedure(s) [facility] [address] [facility]  Type of Rx Prep sent to patient: Suprep  Instructions provided to patient via [facility]    Patient was informed on the following information and verbalized understanding. Screening questionnaire reviewed with patient and complete. If procedure requires anesthesia, a responsible adult needs to be present to accompany the patient home, patient cannot drive after receiving anesthesia. Appointment details are tentative, especially check-in time. Patient will receive a prep-op call 7 days prior to confirm check-in time for procedure. If applicable the patient should contact their pharmacy to verify Rx for procedure prep is ready for pick-up. Patient was advised to call the scheduling department at [phone] if pharmacy states no Rx is available. Patient was advised to call the endoscopy scheduling department if any questions or concerns arise.      [facility] Scheduling Department     
done